# Patient Record
Sex: FEMALE | Race: BLACK OR AFRICAN AMERICAN | Employment: UNEMPLOYED | URBAN - METROPOLITAN AREA
[De-identification: names, ages, dates, MRNs, and addresses within clinical notes are randomized per-mention and may not be internally consistent; named-entity substitution may affect disease eponyms.]

---

## 2017-01-18 DIAGNOSIS — R41.3 MEMORY LOSS: ICD-10-CM

## 2017-01-18 DIAGNOSIS — R53.1 WEAKNESS GENERALIZED: ICD-10-CM

## 2017-01-18 DIAGNOSIS — G35 MULTIPLE SCLEROSIS (HCC): ICD-10-CM

## 2017-01-18 DIAGNOSIS — G82.50 QUADRIPARESIS (HCC): ICD-10-CM

## 2017-01-18 DIAGNOSIS — R27.0 ATAXIA: ICD-10-CM

## 2017-01-18 DIAGNOSIS — G35 OPTIC NEURITIS DUE TO MULTIPLE SCLEROSIS (HCC): ICD-10-CM

## 2017-01-18 DIAGNOSIS — H46.9 OPTIC NEURITIS DUE TO MULTIPLE SCLEROSIS (HCC): ICD-10-CM

## 2017-01-18 NOTE — TELEPHONE ENCOUNTER
No future appointments. Last Appointment My Department:  5/18/2016    Please advise of refill below. Requested Prescriptions     Pending Prescriptions Disp Refills    teriflunomide (AUBAGIO) 14 mg tab 30 Tab 0     Sig: Take 1 Tab by mouth daily (with dinner).  PATIENT MUST CALL FOR APPOINTMENT FOR FURTHER FILLS

## 2017-02-13 DIAGNOSIS — R53.1 WEAKNESS GENERALIZED: ICD-10-CM

## 2017-02-13 DIAGNOSIS — R27.0 ATAXIA: ICD-10-CM

## 2017-02-13 DIAGNOSIS — G82.50 QUADRIPARESIS (HCC): ICD-10-CM

## 2017-02-13 DIAGNOSIS — H46.9 OPTIC NEURITIS DUE TO MULTIPLE SCLEROSIS (HCC): ICD-10-CM

## 2017-02-13 DIAGNOSIS — R41.3 MEMORY LOSS: ICD-10-CM

## 2017-02-13 DIAGNOSIS — G35 MULTIPLE SCLEROSIS (HCC): ICD-10-CM

## 2017-02-13 DIAGNOSIS — G35 OPTIC NEURITIS DUE TO MULTIPLE SCLEROSIS (HCC): ICD-10-CM

## 2017-02-13 NOTE — TELEPHONE ENCOUNTER
No future appointments. Last Appointment My Department:  5/18/16    Please advise of refill below. Last filled 1/18/17 with note for patient to call for appointment for further refills      Called patient and left message for call back to schedule follow up appointment. Was supposed to have a 6 month follow up  Requested Prescriptions     Pending Prescriptions Disp Refills    teriflunomide (AUBAGIO) 14 mg tab 30 Tab 0     Sig: Take 1 Tab by mouth daily (with dinner).  PATIENT MUST CALL FOR APPOINTMENT FOR FURTHER FILLS

## 2017-03-16 DIAGNOSIS — R53.1 WEAKNESS GENERALIZED: ICD-10-CM

## 2017-03-16 DIAGNOSIS — H46.9 OPTIC NEURITIS DUE TO MULTIPLE SCLEROSIS (HCC): ICD-10-CM

## 2017-03-16 DIAGNOSIS — R41.3 MEMORY LOSS: ICD-10-CM

## 2017-03-16 DIAGNOSIS — G82.50 QUADRIPARESIS (HCC): ICD-10-CM

## 2017-03-16 DIAGNOSIS — G35 OPTIC NEURITIS DUE TO MULTIPLE SCLEROSIS (HCC): ICD-10-CM

## 2017-03-16 DIAGNOSIS — G35 MULTIPLE SCLEROSIS (HCC): ICD-10-CM

## 2017-03-16 DIAGNOSIS — R27.0 ATAXIA: ICD-10-CM

## 2017-03-16 RX ORDER — TERIFLUNOMIDE 14 MG/1
TABLET, FILM COATED ORAL
Qty: 28 TAB | Refills: 11 | Status: SHIPPED | OUTPATIENT
Start: 2017-03-16 | End: 2018-02-10 | Stop reason: SDUPTHER

## 2018-02-10 DIAGNOSIS — G35 MULTIPLE SCLEROSIS (HCC): ICD-10-CM

## 2018-02-10 DIAGNOSIS — H46.9 OPTIC NEURITIS DUE TO MULTIPLE SCLEROSIS (HCC): ICD-10-CM

## 2018-02-10 DIAGNOSIS — G82.50 QUADRIPARESIS (HCC): ICD-10-CM

## 2018-02-10 DIAGNOSIS — R53.1 WEAKNESS GENERALIZED: ICD-10-CM

## 2018-02-10 DIAGNOSIS — R41.3 MEMORY LOSS: ICD-10-CM

## 2018-02-10 DIAGNOSIS — R27.0 ATAXIA: ICD-10-CM

## 2018-02-10 DIAGNOSIS — G35 OPTIC NEURITIS DUE TO MULTIPLE SCLEROSIS (HCC): ICD-10-CM

## 2018-02-10 RX ORDER — TERIFLUNOMIDE 14 MG/1
TABLET, FILM COATED ORAL
Qty: 28 TAB | Refills: 11 | Status: SHIPPED | OUTPATIENT
Start: 2018-02-10 | End: 2018-03-16 | Stop reason: SDUPTHER

## 2018-02-28 DIAGNOSIS — R53.1 WEAKNESS GENERALIZED: ICD-10-CM

## 2018-02-28 DIAGNOSIS — G35 MULTIPLE SCLEROSIS (HCC): ICD-10-CM

## 2018-02-28 DIAGNOSIS — G82.50 QUADRIPARESIS (HCC): ICD-10-CM

## 2018-02-28 DIAGNOSIS — R27.0 ATAXIA: ICD-10-CM

## 2018-02-28 DIAGNOSIS — R41.3 MEMORY LOSS: ICD-10-CM

## 2018-02-28 DIAGNOSIS — G35 OPTIC NEURITIS DUE TO MULTIPLE SCLEROSIS (HCC): ICD-10-CM

## 2018-02-28 DIAGNOSIS — H46.9 OPTIC NEURITIS DUE TO MULTIPLE SCLEROSIS (HCC): ICD-10-CM

## 2018-02-28 RX ORDER — TERIFLUNOMIDE 14 MG/1
TABLET, FILM COATED ORAL
Qty: 28 TAB | Refills: 0 | OUTPATIENT
Start: 2018-02-28

## 2018-02-28 NOTE — TELEPHONE ENCOUNTER
No future appointments. Last Appointment My Department:  5/18/2016    Medication was refused because was sent om 2/10/2018. Duplicate. Called patient to set up appointment for follow up.  Left message for call back

## 2018-03-15 DIAGNOSIS — G82.50 QUADRIPARESIS (HCC): ICD-10-CM

## 2018-03-15 DIAGNOSIS — G35 OPTIC NEURITIS DUE TO MULTIPLE SCLEROSIS (HCC): ICD-10-CM

## 2018-03-15 DIAGNOSIS — R53.1 WEAKNESS GENERALIZED: ICD-10-CM

## 2018-03-15 DIAGNOSIS — R41.3 MEMORY LOSS: ICD-10-CM

## 2018-03-15 DIAGNOSIS — R27.0 ATAXIA: ICD-10-CM

## 2018-03-15 DIAGNOSIS — G35 MULTIPLE SCLEROSIS (HCC): ICD-10-CM

## 2018-03-15 DIAGNOSIS — H46.9 OPTIC NEURITIS DUE TO MULTIPLE SCLEROSIS (HCC): ICD-10-CM

## 2018-03-15 NOTE — TELEPHONE ENCOUNTER
Pt would like a rx refill for Aubagio. She was told she had to see Dr. Leidy Bashir first. I scheduled her for the next fu in October. Wants to know if rx can be filled before than.

## 2018-10-10 ENCOUNTER — OFFICE VISIT (OUTPATIENT)
Dept: NEUROLOGY | Age: 48
End: 2018-10-10

## 2018-10-10 VITALS
WEIGHT: 182 LBS | OXYGEN SATURATION: 97 % | SYSTOLIC BLOOD PRESSURE: 144 MMHG | DIASTOLIC BLOOD PRESSURE: 78 MMHG | BODY MASS INDEX: 28.56 KG/M2 | HEART RATE: 70 BPM | RESPIRATION RATE: 12 BRPM | HEIGHT: 67 IN

## 2018-10-10 DIAGNOSIS — R53.1 WEAKNESS GENERALIZED: ICD-10-CM

## 2018-10-10 DIAGNOSIS — G82.50 QUADRIPARESIS (HCC): ICD-10-CM

## 2018-10-10 DIAGNOSIS — R27.0 ATAXIA: ICD-10-CM

## 2018-10-10 DIAGNOSIS — R41.3 MEMORY LOSS: ICD-10-CM

## 2018-10-10 DIAGNOSIS — G35 OPTIC NEURITIS DUE TO MULTIPLE SCLEROSIS (HCC): ICD-10-CM

## 2018-10-10 DIAGNOSIS — H46.9 OPTIC NEURITIS DUE TO MULTIPLE SCLEROSIS (HCC): ICD-10-CM

## 2018-10-10 DIAGNOSIS — G35 MULTIPLE SCLEROSIS (HCC): Primary | ICD-10-CM

## 2018-10-10 RX ORDER — LISINOPRIL AND HYDROCHLOROTHIAZIDE 10; 12.5 MG/1; MG/1
TABLET ORAL
Refills: 0 | COMMUNITY
Start: 2018-07-26 | End: 2021-06-01 | Stop reason: ALTCHOICE

## 2018-10-10 RX ORDER — CLEMASTINE FUMARATE 2.68 MG/1
2.68 TABLET ORAL
Qty: 180 TAB | Refills: 5 | Status: SHIPPED | OUTPATIENT
Start: 2018-10-10 | End: 2020-03-19 | Stop reason: ALTCHOICE

## 2018-10-10 NOTE — MR AVS SNAPSHOT
[address], 
[address], [address] [facility]. 
[phone] Patient: Jian Alejandre MRN: [id number] PED:[birth year] Visit Information Date & Time Provider Department Dept. Phone Encounter #  
 10/10/2018  8:00 AM Radha Romero MD [facility] [id number] Follow-up Instructions Return in about 1 year (around 10/10/2019). Upcoming Health Maintenance Date Due DTaP/Tdap/Td series (1 - Tdap) 5/10/1989 PAP AKA CERVICAL CYTOLOGY 5/10/1989 Shingrix Vaccine Age 50> (1 of 2) 5/10/2018 BREAST CANCER SCRN MAMMOGRAM 5/10/2018 FOBT Q 1 YEAR AGE 50-75 5/10/2018 Influenza Age 5 to Adult 8/1/2018 Allergies as of 10/10/2018  Review Complete On: 10/10/2018 By: Radha Romero MD  
  
 Severity Noted Reaction Type Reactions Doxycycline High 01/18/2016    Hives Bystolic [Nebivolol]  47/91/1409    Other (comments) Chest pain Losartan  05/18/2016    Itching Lotrel [Amlodipine-benazepril]  08/28/2015    Swelling Current Immunizations  Never Reviewed No immunizations on file. Not reviewed this visit You Were Diagnosed With   
  
 Codes Comments Multiple sclerosis ([facility]    -  Primary ICD-10-CM: G35 
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 
ICD-9-CM: 377.30, 340 Weakness generalized     ICD-10-CM: R53.1 ICD-9-CM: 780.79 Memory loss     ICD-10-CM: R41.3 ICD-9-CM: 780.93 Vitals BP Pulse Resp Height(growth percentile) Weight(growth percentile) SpO2  
 144/78 70 12 5' 7\" (1.702 m) 182 lb (82.6 kg) 97% BMI OB Status Smoking Status 28.51 kg/m2 Menopause Never Smoker Vitals History BMI and BSA Data Body Mass Index Body Surface Area 28.51 kg/m 2 1.98 m 2 Preferred Pharmacy Pharmacy Name Phone [address] [facility], [address] [place] [address] Your Updated Medication List  
  
   
This list is accurate as of 10/10/18  9:05 AM.  Always use your most recent med list.  
  
  
  
  
 CENTRUM 0.4-162-18 mg Tab Generic drug:  YS-QQ-CH-Fe-Min-Lycopen-Lutein Take  by mouth. clemastine 2.68 mg Tab tablet Take 1 Tab by mouth two (2) times daily (after meals). lisinopril-hydroCHLOROthiazide 10-12.5 mg per tablet Commonly known as:  PRINZIDE, ZESTORETIC  
TAKE 1 TABLET BY MOUTH EVERY DAY  
  
 teriflunomide 14 mg Tab Commonly known as:  AUBAGIO  
TAKE 1 TABLET DAILY WITH DINNER  
  
 VITAMIN D3 1,000 unit Cap Generic drug:  cholecalciferol Take 2,000 Units by mouth. Prescriptions Printed Refills  
 clemastine 2.68 mg tab tablet 5 Sig: Take 1 Tab by mouth two (2) times daily (after meals). Class: Print Route: Oral  
  
Prescriptions Sent to Pharmacy Refills  
 teriflunomide (AUBAGIO) 14 mg tab 3 Sig: TAKE 1 TABLET DAILY WITH DINNER Class: Normal  
 Pharmacy: [facility], [address] [facility] #: [phone] We Performed the Following CBC WITH AUTOMATED DIFF [40524 CPT(R)] METABOLIC PANEL, COMPREHENSIVE [64410 CPT(R)] Follow-up Instructions Return in about 1 year (around 10/10/2019). To-Do List   
 10/17/2018 Imaging:  MRI BRAIN WO CONT   
  
 10/17/2018 Imaging:  MRI CERV SPINE WO CONT Patient Instructions Office Policies 
 
o Phone calls/patient messages: Please allow up to 24 hours for someone in the office to contact you about your call or message. Be mindful your provider may be out of the office or your message may require further review. We encourage you to use [employer] for your messages as this is a faster, more efficient way to communicate with our office o Medication Refills: 
Prescription medications require up to 48 business hours to process. We encourage you to use Son[employer] for your refills. For controlled medications: Please allow up to 72 business hours to process. Certain medications may require you to  a written prescription at our office. NO narcotic/controlled medications will be prescribed after 4pm Monday through Friday or on weekends 
 
o Form/Paperwork Completion: 
Please note there is a $25 fee for all paperwork completed by our providers. We ask that you allow 7-14 business days. Pre-payment is due prior to picking up/faxing the completed form. You may also download your forms to [employer] to have your doctor print off. A Healthy Lifestyle: Care Instructions Your Care Instructions A healthy lifestyle can help you feel good, stay at a healthy weight, and have plenty of energy for both work and play. A healthy lifestyle is something you can share with your whole family. A healthy lifestyle also can lower your risk for serious health problems, such as high blood pressure, heart disease, and diabetes. You can follow a few steps listed below to improve your health and the health of your family. Follow-up care is a key part of your treatment and safety. Be sure to make and go to all appointments, and call your doctor if you are having problems. It's also a good idea to know your test results and keep a list of the medicines you take. How can you care for yourself at home? · Do not eat too much sugar, fat, or fast foods. You can still have dessert and treats now and then. The goal is moderation. · Start small to improve your eating habits. Pay attention to portion sizes, drink less juice and soda pop, and eat more fruits and vegetables. ¨ Eat a healthy amount of food. A 3-ounce serving of meat, for example, is about the size of a deck of cards. Fill the rest of your plate with vegetables and whole grains. ¨ Limit the amount of soda and sports drinks you have every day. Drink more water when you are thirsty. ¨ Eat at least 5 servings of fruits and vegetables every day. It may seem like a lot, but it is not hard to reach this goal. A serving or helping is 1 piece of fruit, 1 cup of vegetables, or 2 cups of leafy, raw vegetables. Have an apple or some carrot sticks as an afternoon snack instead of a candy bar. Try to have fruits and/or vegetables at every meal. 
· Make exercise part of your daily routine. You may want to start with simple activities, such as walking, bicycling, or slow swimming. Try to be active 30 to 60 minutes every day. You do not need to do all 30 to 60 minutes all at once. For example, you can exercise 3 times a day for 10 or 20 minutes. Moderate exercise is safe for most people, but it is always a good idea to talk to your doctor before starting an exercise program. 
· Keep moving. [facility] the lawn, work in the garden, or [employer]. Take the stairs instead of the elevator at work. · If you smoke, quit. People who smoke have an increased risk for heart attack, stroke, cancer, and other lung illnesses. Quitting is hard, but there are ways to boost your chance of quitting tobacco for good. ¨ Use nicotine gum, patches, or lozenges. ¨ Ask your doctor about stop-smoking programs and medicines. ¨ Keep trying. In addition to reducing your risk of diseases in the future, you will notice some benefits soon after you stop using tobacco. If you have shortness of breath or asthma symptoms, they will likely get better within a few weeks after you quit. · Limit how much alcohol you drink. Moderate amounts of alcohol (up to 2 drinks a day for men, 1 drink a day for women) are okay. But drinking too much can lead to liver problems, high blood pressure, and other health problems. Family health If you have a family, there are many things you can do together to improve your health. · Eat meals together as a family as often as possible. · Eat healthy foods. This includes fruits, vegetables, lean meats and dairy, and whole grains. · Include your family in your fitness plan. Most people think of activities such as jogging or tennis as the way to fitness, but there are many ways you and your family can be more active. Anything that makes you breathe hard and gets your heart pumping is exercise. Here are some tips: 
¨ Walk to do errands or to take your child to school or the bus. ¨ Go for a family bike ride after dinner instead of watching TV. Where can you learn more? Go to http://rayneZENN Motormarialuisa.info/. Enter E745 in the search box to learn more about \"A Healthy Lifestyle: Care Instructions. \" Current as of: December 7, 2017 Content Version: 11.8 © 2785-7652 [employer]. Care instructions adapted under license by NextPage (which disclaims liability or warranty for this information). If you have questions about a medical condition or this instruction, always ask your healthcare professional. [address] any warranty or liability for your use of this information. Introducing \[facility] [place] [facility]\"" & HEALTH SERVICES! [facility] introduces Ledzworld patient portal. Now you can access parts of your medical record, email your doctor's office, and request medication refills online. 1. In your internet browser, go to [email or web address] 2. Click on the First Time User? Click Here link in the Sign In box. You will see the New Member Sign Up page. 3. Enter your Ledzworld Access Code exactly as it appears below. You will not need to use this code after youve completed the sign-up process. If you do not sign up before the expiration date, you must request a new code. · Ledzworld Access Code: [email or web address] Expires: 1/8/2019  8:38 AM 
 
4.  Enter the last four digits of your Social Security Number (xxxx) and Date of Birth (mm/dd/yyyy) as indicated and click Submit. You will be taken to the next sign-up page. 5. Create a Dealised ID. This will be your Dealised login ID and cannot be changed, so think of one that is secure and easy to remember. 6. Create a Dealised password. You can change your password at any time. 7. Enter your Password Reset Question and Answer. This can be used at a later time if you forget your password. 8. Enter your e-mail address. You will receive e-mail notification when new information is available in [address] 9. Click Sign Up. You can now view and download portions of your medical record. 10. Click the Download Summary menu link to download a portable copy of your medical information. If you have questions, please visit the Frequently Asked Questions section of the Dealised website. Remember, Dealised is NOT to be used for urgent needs. For medical emergencies, dial 911. Now available from your iPhone and Android! Please provide this summary of care documentation to your next provider. Your primary care clinician is listed as [address]. If you have any questions after today's visit, please call [phone].

## 2018-10-10 NOTE — PATIENT INSTRUCTIONS
Office Policies 
 
o Phone calls/patient messages: Please allow up to 24 hours for someone in the office to contact you about your call or message. Be mindful your provider may be out of the office or your message may require further review. We encourage you to use [email or web address] for your messages as this is a faster, more efficient way to communicate with our office 
 
o Medication Refills: 
Prescription medications require up to 48 business hours to process. We encourage you to use [email or web address] for your refills. For controlled medications: Please allow up to 72 business hours to process. Certain medications may require you to  a written prescription at our office. NO narcotic/controlled medications will be prescribed after 4pm Monday through Friday or on weekends 
 
o Form/Paperwork Completion: 
Please note there is a $25 fee for all paperwork completed by our providers. We ask that you allow 7-14 business days. Pre-payment is due prior to picking up/faxing the completed form. You may also download your forms to [email or web address] to have your doctor print off. A Healthy Lifestyle: Care Instructions Your Care Instructions A healthy lifestyle can help you feel good, stay at a healthy weight, and have plenty of energy for both work and play. A healthy lifestyle is something you can share with your whole family. A healthy lifestyle also can lower your risk for serious health problems, such as high blood pressure, heart disease, and diabetes. You can follow a few steps listed below to improve your health and the health of your family. Follow-up care is a key part of your treatment and safety. Be sure to make and go to all appointments, and call your doctor if you are having problems. It's also a good idea to know your test results and keep a list of the medicines you take. How can you care for yourself at home? · Do not eat too much sugar, fat, or fast foods.  You can still have dessert and treats now and then. The goal is moderation. · Start small to improve your eating habits. Pay attention to portion sizes, drink less juice and soda pop, and eat more fruits and vegetables. ¨ Eat a healthy amount of food. A 3-ounce serving of meat, for example, is about the size of a deck of cards. Fill the rest of your plate with vegetables and whole grains. ¨ Limit the amount of soda and sports drinks you have every day. Drink more water when you are thirsty. ¨ Eat at least 5 servings of fruits and vegetables every day. It may seem like a lot, but it is not hard to reach this goal. A serving or helping is 1 piece of fruit, 1 cup of vegetables, or 2 cups of leafy, raw vegetables. Have an apple or some carrot sticks as an afternoon snack instead of a candy bar. Try to have fruits and/or vegetables at every meal. 
· Make exercise part of your daily routine. You may want to start with simple activities, such as walking, bicycling, or slow swimming. Try to be active 30 to 60 minutes every day. You do not need to do all 30 to 60 minutes all at once. For example, you can exercise 3 times a day for 10 or 20 minutes. Moderate exercise is safe for most people, but it is always a good idea to talk to your doctor before starting an exercise program. 
· Keep moving. Reading [employer] the lawn, work in the garden, or [employer]. Take the stairs instead of the elevator at work. · If you smoke, quit. People who smoke have an increased risk for heart attack, stroke, cancer, and other lung illnesses. Quitting is hard, but there are ways to boost your chance of quitting tobacco for good. ¨ Use nicotine gum, patches, or lozenges. ¨ Ask your doctor about stop-smoking programs and medicines. ¨ Keep trying.  
In addition to reducing your risk of diseases in the future, you will notice some benefits soon after you stop using tobacco. If you have shortness of breath or asthma symptoms, they will likely get better within a few weeks after you quit. · Limit how much alcohol you drink. Moderate amounts of alcohol (up to 2 drinks a day for men, 1 drink a day for women) are okay. But drinking too much can lead to liver problems, high blood pressure, and other health problems. Family health If you have a family, there are many things you can do together to improve your health. · Eat meals together as a family as often as possible. · Eat healthy foods. This includes fruits, vegetables, lean meats and dairy, and whole grains. · Include your family in your fitness plan. Most people think of activities such as jogging or tennis as the way to fitness, but there are many ways you and your family can be more active. Anything that makes you breathe hard and gets your heart pumping is exercise. Here are some tips: 
¨ Walk to do errands or to take your child to school or the bus. ¨ Go for a family bike ride after dinner instead of watching TV. Where can you learn more? Go to http://rayne-marialuisa.info/. Enter H881 in the search box to learn more about \"A Healthy Lifestyle: Care Instructions. \" Current as of: December 7, 2017 Content Version: 11.8 © 1077-6173 Healthwise, [employer]. Care instructions adapted under license by [employer] (which disclaims liability or warranty for this information). If you have questions about a medical condition or this instruction, always ask your healthcare professional. [place] [address] any warranty or liability for your use of this information.

## 2018-10-11 LAB
ALBUMIN SERPL-MCNC: 4.3 G/DL (ref 3.5–5.5)
ALBUMIN/GLOB SERPL: 1.4 {RATIO} (ref 1.2–2.2)
ALP SERPL-CCNC: 79 IU/L (ref 39–117)
ALT SERPL-CCNC: 13 IU/L (ref 0–32)
AST SERPL-CCNC: 16 IU/L (ref 0–40)
BASOPHILS # BLD AUTO: 0 X10E3/UL (ref 0–0.2)
BASOPHILS NFR BLD AUTO: 0 %
BILIRUB SERPL-MCNC: 0.2 MG/DL (ref 0–1.2)
BUN SERPL-MCNC: 14 MG/DL (ref 6–24)
BUN/CREAT SERPL: 19 (ref 9–23)
CALCIUM SERPL-MCNC: 10 MG/DL (ref 8.7–10.2)
CHLORIDE SERPL-SCNC: 102 MMOL/L (ref 96–106)
CO2 SERPL-SCNC: 23 MMOL/L (ref 20–29)
CREAT SERPL-MCNC: 0.73 MG/DL (ref 0.57–1)
EOSINOPHIL # BLD AUTO: 0.6 X10E3/UL (ref 0–0.4)
EOSINOPHIL NFR BLD AUTO: 6 %
ERYTHROCYTE [DISTWIDTH] IN BLOOD BY AUTOMATED COUNT: 13.2 % (ref 12.3–15.4)
GLOBULIN SER CALC-MCNC: 3.1 G/DL (ref 1.5–4.5)
GLUCOSE SERPL-MCNC: 99 MG/DL (ref 65–99)
HCT VFR BLD AUTO: 39 % (ref 34–46.6)
HGB BLD-MCNC: 13.1 G/DL (ref 11.1–15.9)
IMM GRANULOCYTES # BLD: 0 X10E3/UL (ref 0–0.1)
IMM GRANULOCYTES NFR BLD: 0 %
LYMPHOCYTES # BLD AUTO: 3.8 X10E3/UL (ref 0.7–3.1)
LYMPHOCYTES NFR BLD AUTO: 36 %
MCH RBC QN AUTO: 29.5 PG (ref 26.6–33)
MCHC RBC AUTO-ENTMCNC: 33.6 G/DL (ref 31.5–35.7)
MCV RBC AUTO: 88 FL (ref 79–97)
MONOCYTES # BLD AUTO: 0.6 X10E3/UL (ref 0.1–0.9)
MONOCYTES NFR BLD AUTO: 5 %
NEUTROPHILS # BLD AUTO: 5.6 X10E3/UL (ref 1.4–7)
NEUTROPHILS NFR BLD AUTO: 53 %
PLATELET # BLD AUTO: 315 X10E3/UL (ref 150–379)
POTASSIUM SERPL-SCNC: 3.8 MMOL/L (ref 3.5–5.2)
PROT SERPL-MCNC: 7.4 G/DL (ref 6–8.5)
RBC # BLD AUTO: 4.44 X10E6/UL (ref 3.77–5.28)
SODIUM SERPL-SCNC: 140 MMOL/L (ref 134–144)
WBC # BLD AUTO: 10.7 X10E3/UL (ref 3.4–10.8)

## 2018-10-11 NOTE — PROGRESS NOTES
Consult Subjective:  
 
Jian Alejandre is a 46 y. o.right-handed Afro-American female seen for evaluation at the request of Dr. Jose Chan of new problem of wanting to know if taking lisinopril could help her MS as she read somewhere or patient told her somewhere that it could help her get better because of her recent rat study showing it helped chemically induced MS. I advised her that a rest study gave no evidence for efficacy in humans, and she is on the medication and feels a little better, I told her that is fine because she needs something for her blood pressure anyway and if there is a chance it might conceivably help there is no reason she should not keep taking the medication because it is controlling her blood pressure. I told her about Novahist, and she wants to try that so new prescription sent in for that which does have some evidence that it might conceivably be disease modifying and show some improvement by re-myelinated the nerves. The patient has MS, and last time I saw her was over 2 years ago. She has had no recent follow-up. She had an MRI scan done of the cervical spine and brain with and without contrast, that showed no new active lesions except for a questionable slight enhancing lesion that T1C7 and she has not had any exacerbations, change in her neurologic condition, actually feels somewhat better no longer uses her cane for ambulation and works a few days a week part-time at the [employer]. We renewed her Aubagio which she is taking, and tolerating well without side effects and has done very well for the last several years.   She thinks her rash started after gadolinium enhancement was given, so we will repeat her new MRI scans is been more than 3 years without dye because she is afraid to make her rash worse, but I told her she still has some minor rash which she does I seriously doubt has any relation to the gadolinium dye which was given over 3 years ago she should get back with her dermatologist to try to get a more definitive diagnosis. She's had chronic MS with relapsing form for years, and has had MRIs in the past showing brain lesions and cervical spine lesions and abnormal spinal fluid all diagnosed with MS. She's had MS over 23 years. She has bilateral weakness, cognitive issues in coordination issues and gait tissues and fatigue. The patient is clearly disabled. She has not had any acute exacerbations in the last months. She has had no new focal weakness sensory loss vision problems or cognitive issues that are new. She has to ambulate with a cane because of her gait instability. She is very limited in her exertional activity due to the fatigue and weakness. She's had no other side effects on the medication. Patient also has decreased coordination in her hands, difficulty focusing her eyes and is mildly cognitively slow She's had no other new medical problem complication or illnesses Past Medical History:  
Diagnosis Date  Alopecia  Essential hypertension  Multiple sclerosis ([facility] [address]  Neurological disorder History reviewed. No pertinent surgical history. Family History Problem Relation Age of Onset  Cancer Mother  Heart Disease Mother Social History Substance Use Topics  Smoking status: Never Smoker  Smokeless tobacco: Never Used  Alcohol use No  
   
Current Outpatient Prescriptions Medication Sig Dispense Refill  lisinopril-hydroCHLOROthiazide (PRINZIDE, ZESTORETIC) 10-12.5 mg per tablet TAKE 1 TABLET BY MOUTH EVERY DAY  0  
 clemastine 2.68 mg tab tablet Take 1 Tab by mouth two (2) times daily (after meals). 180 Tab 5  teriflunomide (AUBAGIO) 14 mg tab TAKE 1 TABLET DAILY WITH DINNER 90 Tab 3  Cholecalciferol, Vitamin D3, (VITAMIN D3) 1,000 unit cap Take 2,000 Units by mouth.  DU-CS-CN-Fe-Min-Lycopen-Lutein (CENTRUM) 0.4-162-18 mg tab Take  by mouth. Allergies Allergen Reactions  Doxycycline Hives  Bystolic [Nebivolol] Other (comments) Chest pain  Losartan Itching  Lotrel [Amlodipine-Benazepril] Swelling Review of Systems: A comprehensive review of systems was negative except for: Musculoskeletal: positive for myalgias, arthralgias and stiff joints Neurological: positive for memory problems, paresthesia, coordination problems, gait problems and weakness Objective: I 
 
 
NEUROLOGICAL EXAM: 
 
Appearance: The patient is well developed, well nourished, provides a coherent history and is in no acute distress. Patient has multiple skin lesions, including nodules and papules and darkened rashes from previous lesions on her legs and arms and hands Mental Status: Oriented to time, place and person. Mood and affect appropriate. Cranial Nerves:   Intact visual fields. Fundi are benign, with mild bilateral optic pallor seen. HILDA, EOM's full, no nystagmus, no ptosis. Facial sensation is normal. Corneal reflexes are not tested. Facial movement is symmetric. Hearing is normal bilaterally. Palate is midline with normal sternocleidomastoid and trapezius muscles are normal. Tongue is midline. Neck is supple without meningismus or bruits Temporal arteries are not tender or enlarged Motor:  4/5 strength in upper and lower proximal and distal muscles, left Side seems weaker than the right . Normal bulk and increased tone. No fasciculations. Patient has moderate spastic weakness in all extremities with decreased coordination. Reflexes:   Deep tendon reflexes 3+/4 and symmetrical. No clear Babinski or clonus present Sensory:   Normal to touch, pinprick and vibration mildly decreased in both lower extremities. Gait:  Abnormal gait due to bilateral spastic weakness L>R, patient can walk without assistive devices, but is slow due to her spastic paraparetic gait. Tremor:   No tremor noted. Cerebellar:  Abnormal Romberg and tandem cerebellar signs present. Neurovascular:  Normal heart sounds and regular rhythm, peripheral pulses decreased, and no carotid bruits. Assessment:  
 
Plan:  
 
New problem of questioning whether or not she should take lisinopril, and I told her as long as she controls her blood pressure that medication and want to try because of the rat study that is fine with me Patient given prescription for Novahist in view of recent article showing it might be disease modifying drug and risk and benefits all explained to the patient in general she was started on 2.68 mg twice a day. Relapsing remitting multiple sclerosis with moderate to severe disability, but no acute exacerbation recently but needs repeat MRI scans is been 3 years since her last scans, will do without contrast because of her rash which he feels will start about a gadolinium though I keep telling her I doubted Persistent rash, etiology unknown, recommended patient get back with dermatology to try to figure out what causes a rash We will get metabolic parameters to check the safety of her Aubagio but she is tolerating it well without side effects Patient will continue [place] We will continue to follow, and blood work was drawn today She is to continue to work with her dermatologist and use Vistaril to use p.r.n. for itching She is to continue her current medications and vitamins and vitamin D and remain mentally and physically active as possible. Followup visit in 6 months time. Metabolic parameters will check today Signed By: Tariq aDsh MD   
 October 10, 2018 This note will not be viewable in [address]

## 2019-03-15 DIAGNOSIS — G35 MULTIPLE SCLEROSIS (HCC): Primary | ICD-10-CM

## 2019-03-15 RX ORDER — METHYLPREDNISOLONE 4 MG/1
TABLET ORAL
Qty: 1 DOSE PACK | Refills: 0 | Status: SHIPPED | OUTPATIENT
Start: 2019-03-15 | End: 2020-03-19 | Stop reason: ALTCHOICE

## 2019-04-19 ENCOUNTER — TELEPHONE (OUTPATIENT)
Dept: NEUROLOGY | Age: 49
End: 2019-04-19

## 2019-04-19 NOTE — TELEPHONE ENCOUNTER
----- Message from Candy Casas sent at 4/19/2019 12:20 PM EDT -----  Regarding: /Telephone  Pt is returning a call. Best contact number is    [phone].

## 2019-04-22 ENCOUNTER — TELEPHONE (OUTPATIENT)
Dept: NEUROLOGY | Age: 49
End: 2019-04-22

## 2019-04-22 NOTE — TELEPHONE ENCOUNTER
Jef shipped on 4/15/19     Approval from \"[employer]\"  1/11/19 - 1/9/2020. When running RTE for pt's bcbs - showing ineligible as of 4/17/19. Called [phone]    Was given an ID of [id number]   [id number] Scotty Lawrence,     I am unfamiliar with \"[employer]\". We need a copy of her new insurance in order to proceed with P.A. For Aubagio.

## 2019-04-22 NOTE — TELEPHONE ENCOUNTER
----- Message from Brandin Victroia sent at 4/22/2019  2:25 PM EDT -----  Regarding: Dr. Romaine Pat  Contact: [phone]  Pt returning missed call.

## 2019-04-22 NOTE — TELEPHONE ENCOUNTER
I called and left message for patient to call back with the rx insurance information or come by to drop off the card for this

## 2019-04-24 NOTE — TELEPHONE ENCOUNTER
Called and notified this was for cancellation.  Will keep on the list and call back if I receive a new one

## 2019-05-28 ENCOUNTER — TELEPHONE (OUTPATIENT)
Dept: NEUROLOGY | Age: 49
End: 2019-05-28

## 2019-05-28 NOTE — TELEPHONE ENCOUNTER
Olegario mckeon/ [employer] emailed me and asked if we had obtained a new PA for this patient's Rebif. (stating she has new insurance). When I submitted it to [employer] On 4/26/19 - stated no PA is required. If the patient has anything else, I have asked him to fax me a copy of the PA request w/ the new insurance information today.

## 2019-05-29 ENCOUNTER — TELEPHONE (OUTPATIENT)
Dept: NEUROLOGY | Age: 49
End: 2019-05-29

## 2019-05-29 NOTE — TELEPHONE ENCOUNTER
Called [employer] re: Aubagio authorization as I have received repeated requests from [facility] at [employer] for a P.A. [employer] stated this was initiated directly by the insurance co - (Arlette [id number]). They show an effective date of 1-11-19 - 1-9-2020. Last shipment to patient on 5/13/19. Requested copy of auth - but since it was done by [facility] they do not have a case number or auth number to give me. Forwarding this information to [facility]. Verified SPP is [facility].

## 2019-07-22 ENCOUNTER — TELEPHONE (OUTPATIENT)
Dept: NEUROLOGY | Age: 49
End: 2019-07-22

## 2019-07-22 DIAGNOSIS — G35 OPTIC NEURITIS DUE TO MULTIPLE SCLEROSIS (HCC): ICD-10-CM

## 2019-07-22 DIAGNOSIS — G35 MULTIPLE SCLEROSIS (HCC): ICD-10-CM

## 2019-07-22 DIAGNOSIS — R27.0 ATAXIA: ICD-10-CM

## 2019-07-22 DIAGNOSIS — G82.50 QUADRIPARESIS (HCC): ICD-10-CM

## 2019-07-22 DIAGNOSIS — H46.9 OPTIC NEURITIS DUE TO MULTIPLE SCLEROSIS (HCC): ICD-10-CM

## 2019-07-22 DIAGNOSIS — R53.1 WEAKNESS GENERALIZED: ICD-10-CM

## 2019-07-22 DIAGNOSIS — R41.3 MEMORY LOSS: ICD-10-CM

## 2019-07-22 NOTE — TELEPHONE ENCOUNTER
I called [employer] and found that the patient needs to enrolls in copay assistance as the cost is very high. I called the patient back and found that she thought she was good until next year. I did resend in the medication for qty 80 per Dr. Myers Memory as the qty has changed on the bottles of Aubagio.     I asked her to call [facility] to look in to this and then call me back with an update in the next day or 2

## 2019-07-22 NOTE — TELEPHONE ENCOUNTER
Patient called to request a refill on her Aubagio. She says that she has elizabeth without medication for 3 weeks.        [phone]

## 2019-08-06 ENCOUNTER — TELEPHONE (OUTPATIENT)
Dept: NEUROLOGY | Age: 49
End: 2019-08-06

## 2019-08-06 DIAGNOSIS — R27.0 ATAXIA: ICD-10-CM

## 2019-08-06 DIAGNOSIS — R41.3 MEMORY LOSS: ICD-10-CM

## 2019-08-06 DIAGNOSIS — G35 MULTIPLE SCLEROSIS (HCC): ICD-10-CM

## 2019-08-06 DIAGNOSIS — R53.1 WEAKNESS GENERALIZED: ICD-10-CM

## 2019-08-06 DIAGNOSIS — G82.50 QUADRIPARESIS (HCC): ICD-10-CM

## 2019-08-06 DIAGNOSIS — H46.9 OPTIC NEURITIS DUE TO MULTIPLE SCLEROSIS (HCC): ICD-10-CM

## 2019-08-06 DIAGNOSIS — G35 OPTIC NEURITIS DUE TO MULTIPLE SCLEROSIS (HCC): ICD-10-CM

## 2019-08-16 ENCOUNTER — TELEPHONE (OUTPATIENT)
Dept: NEUROLOGY | Age: 49
End: 2019-08-16

## 2019-08-28 ENCOUNTER — TELEPHONE (OUTPATIENT)
Dept: NEUROLOGY | Age: 49
End: 2019-08-28

## 2019-08-29 ENCOUNTER — TELEPHONE (OUTPATIENT)
Dept: NEUROLOGY | Age: 49
End: 2019-08-29

## 2019-08-29 NOTE — TELEPHONE ENCOUNTER
Re: Rec'd call from [employer]  Ph [phone]    Ref [phone] stated Aubagio not covered, recommended Glat[employer]amer. Refer to [facility] for assistance.

## 2019-08-29 NOTE — TELEPHONE ENCOUNTER
Re: Jennie Mondragon s/w pharmacist - submitted clinicals over the phone for cont of treatment     [employer] I.D. [id number]     Approval Case [id number]  8/28/19  - 8/28/2020. The previous note and auth that is shown in chart for Jan 2020 is for the patient assistance only. Pharmacist will update and will send to [facility] to process shipment. Mario Ritter - please notify patient to except call from [facility].

## 2020-02-05 ENCOUNTER — TELEPHONE (OUTPATIENT)
Dept: NEUROLOGY | Age: 50
End: 2020-02-05

## 2020-02-05 NOTE — TELEPHONE ENCOUNTER
----- Message from Sudeep Nurse sent at 2/5/2020  1:20 PM EST -----  Regarding: Dr Noguera/telephone  Pt is calling regarding her medication that her insurance is not paying for anymore, please call pt at [phone]. This is pt 2nd call.

## 2020-02-07 NOTE — TELEPHONE ENCOUNTER
I tried to call, but call could not be completed per answering message.  Sent a message to the prior authorization specialist

## 2020-02-11 NOTE — TELEPHONE ENCOUNTER
Re: Mirian Escalona  - submitted to [employer]:       Response: An active PA is already on file with expiration date of 08/28/2020. Please wait to resubmit request within 60 days of that expiration date to obtain a PA renewal.    Emailed this response to Jayla at [facility] to schedule shipment.      Dee Dee Joseph, [address] ([employer])  Covered: Retail, Mail Order Unknown: Specialty, Long-Term Care               Member ID: [id number] - F     Group ID: [id number] [facility]      Group Name: [id number] [place] [address]

## 2020-02-12 ENCOUNTER — TELEPHONE (OUTPATIENT)
Dept: NEUROLOGY | Age: 50
End: 2020-02-12

## 2020-02-12 NOTE — TELEPHONE ENCOUNTER
Re: Aubagio Rx     F/u from Leonila/Merle -     I have a prescription with one refill remaining and we last shipped a month's supply of meds to her on 01.21.20. I'll definitely need a new Rx in March.      Thanks!  -Andria lugo to [place]

## 2020-02-13 DIAGNOSIS — R27.0 ATAXIA: ICD-10-CM

## 2020-02-13 DIAGNOSIS — R53.1 WEAKNESS GENERALIZED: ICD-10-CM

## 2020-02-13 DIAGNOSIS — G35 MULTIPLE SCLEROSIS (HCC): ICD-10-CM

## 2020-02-13 DIAGNOSIS — G35 OPTIC NEURITIS DUE TO MULTIPLE SCLEROSIS (HCC): ICD-10-CM

## 2020-02-13 DIAGNOSIS — G82.50 QUADRIPARESIS (HCC): ICD-10-CM

## 2020-02-13 DIAGNOSIS — R41.3 MEMORY LOSS: ICD-10-CM

## 2020-02-13 DIAGNOSIS — H46.9 OPTIC NEURITIS DUE TO MULTIPLE SCLEROSIS (HCC): ICD-10-CM

## 2020-02-20 ENCOUNTER — TELEPHONE (OUTPATIENT)
Dept: NEUROLOGY | Age: 50
End: 2020-02-20

## 2020-02-20 NOTE — TELEPHONE ENCOUNTER
Pt stated her insurance will not cover the Aubagio anymore and wanted to know if Dr. Vernon Martinez will prescribe something else.  She said that she has two pills left      [phone]

## 2020-02-21 NOTE — TELEPHONE ENCOUNTER
I called and she has been receiving her medication. She is just concerned because she has received 2 letters notifying her that her insurance will no longer cover Aubagio. I asked her to call [facility] to make sure the claim was paid for. She acknowledged understanding.  I did make a follow up appointment for her next month since she has not been seen since 2018

## 2020-02-21 NOTE — TELEPHONE ENCOUNTER
Jef [address] should be good to August 2020. Sent message to Leonila/Amyo to check on Rx that was sent in per Dr. Tessa Stuart on 2/13/20.

## 2020-02-28 ENCOUNTER — TELEPHONE (OUTPATIENT)
Dept: NEUROLOGY | Age: 50
End: 2020-02-28

## 2020-02-28 NOTE — TELEPHONE ENCOUNTER
I will hold off on renewing Aubagio until patient has updated office visit. - Current Lacie Pouch is good to August 2020. Sent request to [facility]:     Can you put a future date to check a test claim to make sure Lacie Pouch is still valid. Pt states she has recd 3 letters that it will not be covered. It was delivered per you on 2/21/20 for a 30 day supply    Re: Darius Lopez [birth year]    Melissa Ahmadi,     No future appt scheduled.

## 2020-02-28 NOTE — TELEPHONE ENCOUNTER
Reply from Jeanie Burrows at [employer] - re: [address]' know why she's getting letters -- I don't even see a communication where we've sent her anything. I ran a test claim with today's date and it paid. I also ran a test claim with a date of 03.20.20 and it also paid.

## 2020-03-19 ENCOUNTER — TELEPHONE (OUTPATIENT)
Dept: NEUROLOGY | Age: 50
End: 2020-03-19

## 2020-03-19 ENCOUNTER — OFFICE VISIT (OUTPATIENT)
Dept: NEUROLOGY | Age: 50
End: 2020-03-19

## 2020-03-19 VITALS
HEART RATE: 87 BPM | SYSTOLIC BLOOD PRESSURE: 130 MMHG | BODY MASS INDEX: 31.08 KG/M2 | DIASTOLIC BLOOD PRESSURE: 70 MMHG | OXYGEN SATURATION: 98 % | HEIGHT: 67 IN | WEIGHT: 198 LBS | RESPIRATION RATE: 12 BRPM

## 2020-03-19 DIAGNOSIS — R27.0 ATAXIA: ICD-10-CM

## 2020-03-19 DIAGNOSIS — G35 OPTIC NEURITIS DUE TO MULTIPLE SCLEROSIS (HCC): ICD-10-CM

## 2020-03-19 DIAGNOSIS — G35 MULTIPLE SCLEROSIS (HCC): Primary | ICD-10-CM

## 2020-03-19 DIAGNOSIS — R41.3 MEMORY LOSS: ICD-10-CM

## 2020-03-19 DIAGNOSIS — R53.1 WEAKNESS GENERALIZED: ICD-10-CM

## 2020-03-19 DIAGNOSIS — G82.50 QUADRIPARESIS (HCC): ICD-10-CM

## 2020-03-19 DIAGNOSIS — H46.9 OPTIC NEURITIS DUE TO MULTIPLE SCLEROSIS (HCC): ICD-10-CM

## 2020-03-19 NOTE — TELEPHONE ENCOUNTER
S/w Елена Sierra w/ [facility] - he will arrange for shipping Jef out tonight for tomorrow delivery. I confirmed pt's address  -    Co-pay is zero dollars. Auth/Case [id number]   7/30/19 - 8/28/20. Dr. Hank Lau -     Those letters are generated automatically and they do not research if an Iggy Yuen is already on file. Her insurance allows to be shipped in 30 day increments only. Last shipment - left on porch and does not require a signature.

## 2020-03-19 NOTE — PROGRESS NOTES
Consult    Subjective:     Jonas Morgan is a 44 y. o.right-handed Afro-American female seen for evaluation at the request of Dr. Riley Lentz of new problem of getting her medication of Aubagio approved, because insurance, keep sending her letter saying she has to switch to another medication, but we checked she was approved through August on her current medication which has continued to control her disease activity without clear evidence of recurrence. Patient does have MS relapsing remitting and continues to suffer with marked difficulty with fatigue, spastic paraplegia, cognitive impairment, visual disturbance, physical and mental fatigue, and having difficulty doing her work because she cannot keep up, and is always constantly exhausted. Has had no other side effect on medication, and is tolerating it well. She is not had a recent MRI scan in 5 years, and feels that she is getting worse, so she can do her job anymore, and was all her deficits, we will check another MRI scan to make sure she is not in the secondary progressive stage which sounds like she is, and physically she is disabled and probably should seek disability. Her job requires she stands all day and she is having difficulty doing that getting more arthritis and joint pain. We will also check her metabolic parameters looking for this no other cause for her clinical symptoms. ,  And make sure that there is no side effect of medication. She's had secondary progressive MS with relapsing form for years, and has had MRIs in the past showing brain lesions and cervical spine lesions and abnormal spinal fluid all diagnosed with MS. She's had MS over 25 years. She has bilateral weakness, cognitive issues in coordination issues and gait tissues and fatigue. The patient is clearly disabled. She has not had any acute exacerbations in the last months. She has had no new focal weakness sensory loss vision problems or cognitive issues that are new.  She has to ambulate with a cane because of her gait instability. She is very limited in her exertional activity due to the fatigue and weakness. She's had no other side effects on the medication. Patient also has decreased coordination in her hands, difficulty focusing her eyes and is mildly cognitively slow       She's had no other new medical problem complication or illnesses    Past Medical History:   Diagnosis Date    Alopecia     Essential hypertension     Multiple sclerosis ([address]     Neurological disorder       History reviewed. No pertinent surgical history. Family History   Problem Relation Age of Onset    Cancer Mother     Heart Disease Mother       Social History     Tobacco Use    Smoking status: Never Smoker    Smokeless tobacco: Never Used   Substance Use Topics    Alcohol use: No       Current Outpatient Medications   Medication Sig Dispense Refill    teriflunomide (AUBAGIO) 14 mg tablet TAKE 1 TABLET DAILY WITH DINNER 90 Tab 5    methylPREDNISolone (MEDROL DOSEPACK) 4 mg tablet Use as directed 1 Dose Pack 0    lisinopril-hydroCHLOROthiazide (PRINZIDE, ZESTORETIC) 10-12.5 mg per tablet TAKE 1 TABLET BY MOUTH EVERY DAY  0    clemastine 2.68 mg tab tablet Take 1 Tab by mouth two (2) times daily (after meals). 180 Tab 5    Cholecalciferol, Vitamin D3, (VITAMIN D3) 1,000 unit cap Take 2,000 Units by mouth.  ND-AQ-OH-Fe-Min-Lycopen-Lutein (CENTRUM) 0.4-162-18 mg tab Take  by mouth. Allergies   Allergen Reactions    Doxycycline Hives    Bystolic [Nebivolol] Other (comments)     Chest pain    Losartan Itching    Lotrel [Amlodipine-Benazepril] Swelling        Review of Systems:  A comprehensive review of systems was negative except for: Musculoskeletal: positive for myalgias, arthralgias and stiff joints  Neurological: positive for memory problems, paresthesia, coordination problems, gait problems and weakness     Objective:     I      NEUROLOGICAL EXAM:    Appearance:   The patient is well developed, well nourished, provides a fair history and is in no acute distress. Patient has multiple skin lesions, including nodules and papules and darkened rashes from previous lesions on her legs and arms and hands   Mental Status: Oriented to time, place and person and the president, but patient has difficulty doing serial sevens and remembering 3 of 3 words at 30 seconds, and doing math, and drawing a clock showing the time 10:50, and does have some mild cognitive impairment. . Mood and affect appropriate, but depressed. Cranial Nerves:   Intact visual fields. Fundi are benign, with mild bilateral optic pallor seen. HILDA, EOM's full, no nystagmus, no ptosis. Facial sensation is normal. Corneal reflexes are not tested. Facial movement is symmetric. Hearing is normal bilaterally. Palate is midline with normal sternocleidomastoid and trapezius muscles are normal. Tongue is midline. Neck is supple without meningismus or bruits  Temporal arteries are not tender or enlarged   Motor:  4/5 strength in upper and lower proximal and distal muscles, left Side seems weaker than the right . Normal bulk and increased tone. No fasciculations. Patient has moderate spastic weakness in all extremities with decreased coordination. Rapid alternating movement is slow in all extremities. Reflexes:   Deep tendon reflexes 3+/4 and symmetrical. No clear Babinski or clonus present   Sensory:   Normal to touch, pinprick and vibration mildly decreased in both lower extremities. Double simultaneous stimulation is intact   Gait:  Abnormal gait due to bilateral spastic weakness L>R, patient can walk without assistive devices, but is slow due to her spastic paraparetic gait, and walks with a marked limp. Tremor:   No tremor noted. Cerebellar:  Abnormal Romberg and tandem cerebellar signs present, and finger-nose-finger examination is unremarkable.    Neurovascular:  Normal heart sounds and regular rhythm, peripheral pulses decreased, and no carotid bruits. 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 Relapsing remitting multiple sclerosis with moderate to severe disability, but no acute exacerbation recently but needs repeat MRI scans is been 5 years since her last scans, will do without contrast because of her rash which he feels will start about a gadolinium though I keep telling her I doubted  Persistent rash, etiology unknown, recommended patient get back with dermatology to try to figure out what causes a rash  We will get metabolic parameters to check the safety of her Aubagio but she is tolerating it well without side effects  Patient will continue aubagio   We will continue to follow, and blood work was drawn today   She is to continue to work with her dermatologist and use Vistaril to use p.r.n. for itching  She is to continue her current medications and vitamins and vitamin D and remain mentally and physically active as possible. Followup visit in 6 months time. Metabolic parameters will check today    Signed By: Shani Lynch MD     March 19, 2020       This note will not be viewable in [address]

## 2020-03-19 NOTE — TELEPHONE ENCOUNTER
----- Message from Polly Kaiser sent at 3/19/2020  9:15 AM EDT -----  Regarding: DR. Noguera/Telephone  Patient return call    Caller's first and last name and relationship (if not the patient):Pt      Best contact number(s):[phone]      Whose call is being returned:Lily      Details to clarify the request:      Polly Kaiser

## 2020-03-19 NOTE — LETTER
3/19/20 Patient: Elvi Olmedo YOB: 1970 Date of Visit: 3/19/2020 Doug Mancia, [address] [id number] VIA Facsimile: [phone] Dear Doug Mancia MD, Thank you for referring Ms. Sandra Truong to [address] [place] [address] for evaluation. My notes for this consultation are attached. Consult Subjective:  
 
Elvi Olmedo is a 44 y. o.right-handed Afro-American female seen for evaluation at the request of Dr. Dorothy Owens of new problem of wanting to know if taking lisinopril could help her MS as she read somewhere or patient told her somewhere that it could help her get better because of her recent rat study showing it helped chemically induced MS. I advised her that a rest study gave no evidence for efficacy in humans, and she is on the medication and feels a little better, I told her that is fine because she needs something for her blood pressure anyway and if there is a chance it might conceivably help there is no reason she should not keep taking the medication because it is controlling her blood pressure. I told her about Novahist, and she wants to try that so new prescription sent in for that which does have some evidence that it might conceivably be disease modifying and show some improvement by re-myelinated the nerves. The patient has MS, and last time I saw her was over 2 years ago. She has had no recent follow-up. She had an MRI scan done of the cervical spine and brain with and without contrast, that showed no new active lesions except for a questionable slight enhancing lesion that T1C7 and she has not had any exacerbations, change in her neurologic condition, actually feels somewhat better no longer uses her cane for ambulation and works a few days a week part-time at the [employer].   We renewed her Aubagio which she is taking, and tolerating well without side effects and has done very well for the last several years. She thinks her rash started after gadolinium enhancement was given, so we will repeat her new MRI scans is been more than 3 years without dye because she is afraid to make her rash worse, but I told her she still has some minor rash which she does I seriously doubt has any relation to the gadolinium dye which was given over 3 years ago she should get back with her dermatologist to try to get a more definitive diagnosis. She's had chronic MS with relapsing form for years, and has had MRIs in the past showing brain lesions and cervical spine lesions and abnormal spinal fluid all diagnosed with MS. She's had MS over 23 years. She has bilateral weakness, cognitive issues in coordination issues and gait tissues and fatigue. The patient is clearly disabled. She has not had any acute exacerbations in the last months. She has had no new focal weakness sensory loss vision problems or cognitive issues that are new. She has to ambulate with a cane because of her gait instability. She is very limited in her exertional activity due to the fatigue and weakness. She's had no other side effects on the medication. Patient also has decreased coordination in her hands, difficulty focusing her eyes and is mildly cognitively slow She's had no other new medical problem complication or illnesses Past Medical History:  
Diagnosis Date  Alopecia  Essential hypertension  Multiple sclerosis ([facility] [address]  Neurological disorder History reviewed. No pertinent surgical history. Family History Problem Relation Age of Onset  Cancer Mother  Heart Disease Mother Social History Tobacco Use  Smoking status: Never Smoker  Smokeless tobacco: Never Used Substance Use Topics  Alcohol use: No  
   
Current Outpatient Medications Medication Sig Dispense Refill  teriflunomide (AUBAGIO) 14 mg tablet TAKE 1 TABLET DAILY WITH DINNER 90 Tab 5  methylPREDNISolone (MEDROL DOSEPACK) 4 mg tablet Use as directed 1 Dose Pack 0  
 lisinopril-hydroCHLOROthiazide (PRINZIDE, ZESTORETIC) 10-12.5 mg per tablet TAKE 1 TABLET BY MOUTH EVERY DAY  0  
 clemastine 2.68 mg tab tablet Take 1 Tab by mouth two (2) times daily (after meals). 180 Tab 5  Cholecalciferol, Vitamin D3, (VITAMIN D3) 1,000 unit cap Take 2,000 Units by mouth.  RI-HZ-PV-Fe-Min-Lycopen-Lutein (CENTRUM) 0.4-162-18 mg tab Take  by mouth. Allergies Allergen Reactions  Doxycycline Hives  Bystolic [Nebivolol] Other (comments) Chest pain  Losartan Itching  Lotrel [Amlodipine-Benazepril] Swelling Review of Systems: A comprehensive review of systems was negative except for: Musculoskeletal: positive for myalgias, arthralgias and stiff joints Neurological: positive for memory problems, paresthesia, coordination problems, gait problems and weakness Objective: I 
 
 
NEUROLOGICAL EXAM: 
 
Appearance: The patient is well developed, well nourished, provides a coherent history and is in no acute distress. Patient has multiple skin lesions, including nodules and papules and darkened rashes from previous lesions on her legs and arms and hands Mental Status: Oriented to time, place and person. Mood and affect appropriate. Cranial Nerves:   Intact visual fields. Fundi are benign, with mild bilateral optic pallor seen. HILDA, EOM's full, no nystagmus, no ptosis. Facial sensation is normal. Corneal reflexes are not tested. Facial movement is symmetric. Hearing is normal bilaterally. Palate is midline with normal sternocleidomastoid and trapezius muscles are normal. Tongue is midline. Neck is supple without meningismus or bruits Temporal arteries are not tender or enlarged Motor:  4/5 strength in upper and lower proximal and distal muscles, left Side seems weaker than the right . Normal bulk and increased tone.  No fasciculations. Patient has moderate spastic weakness in all extremities with decreased coordination. Reflexes:   Deep tendon reflexes 3+/4 and symmetrical. No clear Babinski or clonus present Sensory:   Normal to touch, pinprick and vibration mildly decreased in both lower extremities. Gait:  Abnormal gait due to bilateral spastic weakness L>R, patient can walk without assistive devices, but is slow due to her spastic paraparetic gait. Tremor:   No tremor noted. Cerebellar:  Abnormal Romberg and tandem cerebellar signs present. Neurovascular:  Normal heart sounds and regular rhythm, peripheral pulses decreased, and no carotid bruits. Assessment:  
 
Plan:  
 
New problem of questioning whether or not she should take lisinopril, and I told her as long as she controls her blood pressure that medication and want to try because of the rat study that is fine with me Patient given prescription for Novahist in view of recent article showing it might be disease modifying drug and risk and benefits all explained to the patient in general she was started on 2.68 mg twice a day. Relapsing remitting multiple sclerosis with moderate to severe disability, but no acute exacerbation recently but needs repeat MRI scans is been 3 years since her last scans, will do without contrast because of her rash which he feels will start about a gadolinium though I keep telling her I doubted Persistent rash, etiology unknown, recommended patient get back with dermatology to try to figure out what causes a rash We will get metabolic parameters to check the safety of her Aubagio but she is tolerating it well without side effects Patient will continue [place] We will continue to follow, and blood work was drawn today She is to continue to work with her dermatologist and use Vistaril to use p.r.n. for itching She is to continue her current medications and vitamins and vitamin D and remain mentally and physically active as possible. Followup visit in 6 months time. Metabolic parameters will check today Signed By: Dhruv Peraza MD   
 March 19, 2020 This note will not be viewable in [address] Consult Subjective:  
 
Mira Li is a 44 y. o.right-handed Afro-American female seen for evaluation at the request of Dr. Akshat Moore of new problem of getting her medication of Aubagio approved, because insurance, keep sending her letter saying she has to switch to another medication, but we checked she was approved through August on her current medication which has continued to control her disease activity without clear evidence of recurrence. Patient does have MS relapsing remitting and continues to suffer with marked difficulty with fatigue, spastic paraplegia, cognitive impairment, visual disturbance, physical and mental fatigue, and having difficulty doing her work because she cannot keep up, and is always constantly exhausted. Has had no other side effect on medication, and is tolerating it well. She is not had a recent MRI scan in 5 years, and feels that she is getting worse, so she can do her job anymore, and was all her deficits, we will check another MRI scan to make sure she is not in the secondary progressive stage which sounds like she is, and physically she is disabled and probably should seek disability. Her job requires she stands all day and she is having difficulty doing that getting more arthritis and joint pain. We will also check her metabolic parameters looking for this no other cause for her clinical symptoms. ,  And make sure that there is no side effect of medication. She's had secondary progressive MS with relapsing form for years, and has had MRIs in the past showing brain lesions and cervical spine lesions and abnormal spinal fluid all diagnosed with MS. She's had MS over 25 years.  She has bilateral weakness, cognitive issues in coordination issues and gait tissues and fatigue. The patient is clearly disabled. She has not had any acute exacerbations in the last months. She has had no new focal weakness sensory loss vision problems or cognitive issues that are new. She has to ambulate with a cane because of her gait instability. She is very limited in her exertional activity due to the fatigue and weakness. She's had no other side effects on the medication. Patient also has decreased coordination in her hands, difficulty focusing her eyes and is mildly cognitively slow She's had no other new medical problem complication or illnesses Past Medical History:  
Diagnosis Date  Alopecia  Essential hypertension  Multiple sclerosis ([facility] [place] [address]  Neurological disorder History reviewed. No pertinent surgical history. Family History Problem Relation Age of Onset  Cancer Mother  Heart Disease Mother Social History Tobacco Use  Smoking status: Never Smoker  Smokeless tobacco: Never Used Substance Use Topics  Alcohol use: No  
   
Current Outpatient Medications Medication Sig Dispense Refill  teriflunomide (AUBAGIO) 14 mg tablet TAKE 1 TABLET DAILY WITH DINNER 90 Tab 5  
 methylPREDNISolone (MEDROL DOSEPACK) 4 mg tablet Use as directed 1 Dose Pack 0  
 lisinopril-hydroCHLOROthiazide (PRINZIDE, ZESTORETIC) 10-12.5 mg per tablet TAKE 1 TABLET BY MOUTH EVERY DAY  0  
 clemastine 2.68 mg tab tablet Take 1 Tab by mouth two (2) times daily (after meals). 180 Tab 5  Cholecalciferol, Vitamin D3, (VITAMIN D3) 1,000 unit cap Take 2,000 Units by mouth.  IR-EZ-CD-Fe-Min-Lycopen-Lutein (CENTRUM) 0.4-162-18 mg tab Take  by mouth. Allergies Allergen Reactions  Doxycycline Hives  Bystolic [Nebivolol] Other (comments) Chest pain  Losartan Itching  Lotrel [Amlodipine-Benazepril] Swelling Review of Systems: A comprehensive review of systems was negative except for: Musculoskeletal: positive for myalgias, arthralgias and stiff joints Neurological: positive for memory problems, paresthesia, coordination problems, gait problems and weakness Objective: I 
 
 
NEUROLOGICAL EXAM: 
 
Appearance: The patient is well developed, well nourished, provides a fair history and is in no acute distress. Patient has multiple skin lesions, including nodules and papules and darkened rashes from previous lesions on her legs and arms and hands Mental Status: Oriented to time, place and person and the president, but patient has difficulty doing serial sevens and remembering 3 of 3 words at 30 seconds, and doing math, and drawing a clock showing the time 10:50, and does have some mild cognitive impairment. . Mood and affect appropriate, but depressed. Cranial Nerves:   Intact visual fields. Fundi are benign, with mild bilateral optic pallor seen. HILDA, EOM's full, no nystagmus, no ptosis. Facial sensation is normal. Corneal reflexes are not tested. Facial movement is symmetric. Hearing is normal bilaterally. Palate is midline with normal sternocleidomastoid and trapezius muscles are normal. Tongue is midline. Neck is supple without meningismus or bruits Temporal arteries are not tender or enlarged Motor:  4/5 strength in upper and lower proximal and distal muscles, left Side seems weaker than the right . Normal bulk and increased tone. No fasciculations. Patient has moderate spastic weakness in all extremities with decreased coordination. Rapid alternating movement is slow in all extremities. Reflexes:   Deep tendon reflexes 3+/4 and symmetrical. No clear Babinski or clonus present Sensory:   Normal to touch, pinprick and vibration mildly decreased in both lower extremities. Double simultaneous stimulation is intact Gait:  Abnormal gait due to bilateral spastic weakness L>R, patient can walk without assistive devices, but is slow due to her spastic paraparetic gait, and walks with a marked limp. Tremor:   No tremor noted. Cerebellar:  Abnormal Romberg and tandem cerebellar signs present, and finger-nose-finger examination is unremarkable. Neurovascular:  Normal heart sounds and regular rhythm, peripheral pulses decreased, and no carotid bruits. Assessment:  
 
Plan:  
 
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 Relapsing remitting multiple sclerosis with moderate to severe disability, but no acute exacerbation recently but needs repeat MRI scans is been 5 years since her last scans, will do without contrast because of her rash which he feels will start about a gadolinium though I keep telling her I doubted Persistent rash, etiology unknown, recommended patient get back with dermatology to try to figure out what causes a rash We will get metabolic parameters to check the safety of her Aubagio but she is tolerating it well without side effects Patient will continue [place] We will continue to follow, and blood work was drawn today She is to continue to work with her dermatologist and use Vistaril to use p.r.n. for itching She is to continue her current medications and vitamins and vitamin D and remain mentally and physically active as possible. Followup visit in 6 months time. Metabolic parameters will check today Signed By: Kody Mejia MD   
 March 19, 2020 This note will not be viewable in [address] If you have questions, please do not hesitate to call me. I look forward to following your patient along with you. Sincerely, Kody Mejia MD

## 2020-03-19 NOTE — PATIENT INSTRUCTIONS

## 2020-03-20 LAB
ALBUMIN SERPL-MCNC: 4.2 G/DL (ref 3.8–4.9)
ALBUMIN/GLOB SERPL: 1.4 {RATIO} (ref 1.2–2.2)
ALP SERPL-CCNC: 72 IU/L (ref 39–117)
ALT SERPL-CCNC: 19 IU/L (ref 0–32)
AST SERPL-CCNC: 16 IU/L (ref 0–40)
BASOPHILS # BLD AUTO: 0.1 X10E3/UL (ref 0–0.2)
BASOPHILS NFR BLD AUTO: 1 %
BILIRUB SERPL-MCNC: 0.2 MG/DL (ref 0–1.2)
BUN SERPL-MCNC: 11 MG/DL (ref 6–24)
BUN/CREAT SERPL: 15 (ref 9–23)
CALCIUM SERPL-MCNC: 9.6 MG/DL (ref 8.7–10.2)
CHLORIDE SERPL-SCNC: 102 MMOL/L (ref 96–106)
CO2 SERPL-SCNC: 21 MMOL/L (ref 20–29)
CREAT SERPL-MCNC: 0.71 MG/DL (ref 0.57–1)
EOSINOPHIL # BLD AUTO: 0.6 X10E3/UL (ref 0–0.4)
EOSINOPHIL NFR BLD AUTO: 6 %
ERYTHROCYTE [DISTWIDTH] IN BLOOD BY AUTOMATED COUNT: 12.4 % (ref 11.7–15.4)
GLOBULIN SER CALC-MCNC: 3 G/DL (ref 1.5–4.5)
GLUCOSE SERPL-MCNC: 106 MG/DL (ref 65–99)
HCT VFR BLD AUTO: 36.5 % (ref 34–46.6)
HGB BLD-MCNC: 12.8 G/DL (ref 11.1–15.9)
IMM GRANULOCYTES # BLD AUTO: 0 X10E3/UL (ref 0–0.1)
IMM GRANULOCYTES NFR BLD AUTO: 0 %
LYMPHOCYTES # BLD AUTO: 3.7 X10E3/UL (ref 0.7–3.1)
LYMPHOCYTES NFR BLD AUTO: 33 %
MCH RBC QN AUTO: 29.9 PG (ref 26.6–33)
MCHC RBC AUTO-ENTMCNC: 35.1 G/DL (ref 31.5–35.7)
MCV RBC AUTO: 85 FL (ref 79–97)
MONOCYTES # BLD AUTO: 0.7 X10E3/UL (ref 0.1–0.9)
MONOCYTES NFR BLD AUTO: 6 %
NEUTROPHILS # BLD AUTO: 6 X10E3/UL (ref 1.4–7)
NEUTROPHILS NFR BLD AUTO: 54 %
PLATELET # BLD AUTO: 345 X10E3/UL (ref 150–450)
POTASSIUM SERPL-SCNC: 3.7 MMOL/L (ref 3.5–5.2)
PROT SERPL-MCNC: 7.2 G/DL (ref 6–8.5)
RBC # BLD AUTO: 4.28 X10E6/UL (ref 3.77–5.28)
SODIUM SERPL-SCNC: 141 MMOL/L (ref 134–144)
WBC # BLD AUTO: 11.1 X10E3/UL (ref 3.4–10.8)

## 2020-03-23 ENCOUNTER — TELEPHONE (OUTPATIENT)
Dept: NEUROLOGY | Age: 50
End: 2020-03-23

## 2020-03-30 ENCOUNTER — TELEPHONE (OUTPATIENT)
Dept: NEUROLOGY | Age: 50
End: 2020-03-30

## 2020-07-16 ENCOUNTER — APPOINTMENT (OUTPATIENT)
Dept: MRI IMAGING | Age: 50
End: 2020-07-16
Attending: PSYCHIATRY & NEUROLOGY
Payer: COMMERCIAL

## 2020-07-16 ENCOUNTER — HOSPITAL ENCOUNTER (OUTPATIENT)
Dept: MRI IMAGING | Age: 50
Discharge: HOME | End: 2020-07-16
Attending: PSYCHIATRY & NEUROLOGY
Payer: COMMERCIAL

## 2020-07-16 DIAGNOSIS — G35 MULTIPLE SCLEROSIS (HCC): ICD-10-CM

## 2020-07-16 DIAGNOSIS — R41.3 MEMORY LOSS: ICD-10-CM

## 2020-07-16 DIAGNOSIS — H46.9 OPTIC NEURITIS DUE TO MULTIPLE SCLEROSIS (HCC): ICD-10-CM

## 2020-07-16 DIAGNOSIS — R53.1 WEAKNESS GENERALIZED: ICD-10-CM

## 2020-07-16 DIAGNOSIS — R27.0 ATAXIA: ICD-10-CM

## 2020-07-16 DIAGNOSIS — G35 OPTIC NEURITIS DUE TO MULTIPLE SCLEROSIS (HCC): ICD-10-CM

## 2020-07-16 DIAGNOSIS — G82.50 QUADRIPARESIS (HCC): ICD-10-CM

## 2020-07-16 PROCEDURE — 70551 MRI BRAIN STEM W/O DYE: CPT

## 2020-07-16 PROCEDURE — 72141 MRI NECK SPINE W/O DYE: CPT

## 2020-07-17 ENCOUNTER — DOCUMENTATION ONLY (OUTPATIENT)
Dept: NEUROLOGY | Age: 50
End: 2020-07-17

## 2020-07-17 NOTE — PROGRESS NOTES
I called the patient, left message MRI scans look stable, less lesions than before, but still MS lesions present, no contrast given so cannot really separate out new wounds, she does have progression of arthritis at the C3-4 level which we will follow conservatively.   Patient says that is great, she is doing well, continue current therapy

## 2020-08-18 ENCOUNTER — TELEPHONE (OUTPATIENT)
Dept: NEUROLOGY | Age: 50
End: 2020-08-18

## 2020-08-18 NOTE — TELEPHONE ENCOUNTER
Aubagio approval     Approvedtoday  [id number]; Review Type:Prior Auth; Coverage Start Date:07/19/2020; Coverage End Date:08/18/2021;    Faxed to [employer].

## 2020-08-18 NOTE — TELEPHONE ENCOUNTER
Aubagio renewal sent to [facility] via [facility].      Key: [id number] - [place] Case ID: [id number]

## 2020-10-09 ENCOUNTER — TELEPHONE (OUTPATIENT)
Dept: NEUROLOGY | Age: 50
End: 2020-10-09

## 2020-10-09 NOTE — TELEPHONE ENCOUNTER
rec'd request for Aubagio renewal from [employer]. The Chyna Harris is valid from 7/19/20  and was done 8/18/20. They sent an old expiration from 2019. Refaxed to [place] at [employer] to advise of already approved and same insurance since April 2019.

## 2020-11-30 ENCOUNTER — OFFICE VISIT (OUTPATIENT)
Dept: NEUROLOGY | Age: 50
End: 2020-11-30
Payer: COMMERCIAL

## 2020-11-30 VITALS
OXYGEN SATURATION: 93 % | TEMPERATURE: 98 F | HEIGHT: 67 IN | RESPIRATION RATE: 16 BRPM | SYSTOLIC BLOOD PRESSURE: 165 MMHG | WEIGHT: 198 LBS | HEART RATE: 87 BPM | DIASTOLIC BLOOD PRESSURE: 90 MMHG | BODY MASS INDEX: 31.08 KG/M2

## 2020-11-30 DIAGNOSIS — H46.9 OPTIC NEURITIS DUE TO MULTIPLE SCLEROSIS (HCC): ICD-10-CM

## 2020-11-30 DIAGNOSIS — R27.0 ATAXIA: ICD-10-CM

## 2020-11-30 DIAGNOSIS — M54.16 LUMBAR BACK PAIN WITH RADICULOPATHY AFFECTING LEFT LOWER EXTREMITY: ICD-10-CM

## 2020-11-30 DIAGNOSIS — R53.1 WEAKNESS GENERALIZED: ICD-10-CM

## 2020-11-30 DIAGNOSIS — G35 OPTIC NEURITIS DUE TO MULTIPLE SCLEROSIS (HCC): ICD-10-CM

## 2020-11-30 DIAGNOSIS — G35 MULTIPLE SCLEROSIS (HCC): Primary | ICD-10-CM

## 2020-11-30 DIAGNOSIS — M47.22 CERVICAL RADICULOPATHY DUE TO DEGENERATIVE JOINT DISEASE OF SPINE: ICD-10-CM

## 2020-11-30 DIAGNOSIS — M54.16 LUMBAR BACK PAIN WITH RADICULOPATHY AFFECTING RIGHT LOWER EXTREMITY: ICD-10-CM

## 2020-11-30 DIAGNOSIS — G82.50 QUADRIPARESIS (HCC): ICD-10-CM

## 2020-11-30 DIAGNOSIS — R41.3 MEMORY LOSS: ICD-10-CM

## 2020-11-30 PROCEDURE — 99214 OFFICE O/P EST MOD 30 MIN: CPT | Performed by: PSYCHIATRY & NEUROLOGY

## 2020-11-30 RX ORDER — DULOXETIN HYDROCHLORIDE 30 MG/1
30 CAPSULE, DELAYED RELEASE ORAL DAILY
Qty: 30 CAP | Refills: 5 | Status: SHIPPED | OUTPATIENT
Start: 2020-11-30 | End: 2021-01-11 | Stop reason: SDUPTHER

## 2020-11-30 NOTE — LETTER
11/30/20 Patient: Cheryl Burciaga YOB: 1970 Date of Visit: 11/30/2020 Chip Gee, [address] [address] VIA Facsimile: [phone] Dear Chip Gee MD, Thank you for referring Ms. Ed Orosco to [address] [place] [address] for evaluation. My notes for this consultation are attached. Consult Subjective:  
 
Cheryl Burciaga is a 44 y. o.right-handed Afro-American female seen for evaluation at the request of Dr. Parmjit Hanna of new problem of having marked increased in pain in her legs, particular when she is standing up and bearing weight for period of time, with the pain being an aching sensation from the knees down, occasionally into the left hip and radiating from the back region, left side greater than right, and occasionally even having some intermittent radiating into her left arm. It all sounds like degenerative arthritis and maybe some mild radiculopathy but she is very upset about it, and since she never has pain when she is laying flat, and occasionally when she is sitting the pain will occur, but is mainly when she is standing and I told her that probably not her MS, because it would be more persistent. She just had an MRI scan done July 2020 that showed stable disease since 2014 of the brain, and improved disease in the cervical spine consistent with her MS, but no new lesions, but did show some mild arthritis. Patient is currently on Aubagio for her MS. She tolerated it well without clear side effects. Patient is still trying to work 2 jobs. Patient does have MS relapsing remitting and continues to suffer with marked difficulty with fatigue, spastic paraplegia, cognitive impairment, visual disturbance, physical and mental fatigue, and having difficulty doing her work because she cannot keep up, and is always constantly exhausted.   Has had no other side effect on medication, and is tolerating it well. She is not had a recent MRI scan in 5 years, and feels that she is getting worse, so she can do her job anymore, and her job requires she stands all day and she is having difficulty doing that getting more arthritis and joint pain. We will also check her metabolic parameters and connective tissue parameters looking for other cause for her clinical symptoms. 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 She's had MS over 25 years. She has bilateral weakness, cognitive issues in coordination issues and gait tissues and fatigue. The patient is clearly disabled. She has not had any acute exacerbations in the last months. She has had no new focal weakness sensory loss vision problems or cognitive issues that are new. She has to ambulate with a cane because of her gait instability. She is very limited in her exertional activity due to the fatigue and weakness. She's had no other side effects on the medication. Patient also has decreased coordination in her hands, difficulty focusing her eyes and is mildly cognitively slow She's had no other new medical problem complication or illnesses Past Medical History:  
Diagnosis Date  Alopecia  Essential hypertension  Multiple sclerosis ([facility] [address]  Neurological disorder History reviewed. No pertinent surgical history. Family History Problem Relation Age of Onset  Cancer Mother   
     kidney  Heart Disease Mother  Kidney Disease Mother  Diabetes Mother  Hypertension Mother  Other Father GSW  Heart Disease Brother  Hypertension Brother Social History Tobacco Use  Smoking status: Never Smoker  Smokeless tobacco: Never Used Substance Use Topics  Alcohol use: No  
   
Current Outpatient Medications Medication Sig Dispense Refill  teriflunomide (Aubagio) 14 mg tablet TAKE 1 TABLET DAILY WITH DINNER 90 Tab 5  DULoxetine (CYMBALTA) 30 mg capsule Take 1 Cap by mouth daily. 30 Cap 5  
 lisinopril-hydroCHLOROthiazide (PRINZIDE, ZESTORETIC) 10-12.5 mg per tablet TAKE 1 TABLET BY MOUTH EVERY DAY  0  
 Cholecalciferol, Vitamin D3, (VITAMIN D3) 1,000 unit cap Take 2,000 Units by mouth. Allergies Allergen Reactions  Doxycycline Hives  Bystolic [Nebivolol] Other (comments) Chest pain  Losartan Itching  Lotrel [Amlodipine-Benazepril] Swelling Review of Systems: A comprehensive review of systems was negative except for: Musculoskeletal: positive for myalgias, arthralgias and stiff joints Neurological: positive for memory problems, paresthesia, coordination problems, gait problems and weakness Objective: I 
 
 
NEUROLOGICAL EXAM: 
 
Appearance: The patient is well developed, well nourished, provides a fair history and is in no acute distress. Patient has multiple skin lesions, including nodules and papules and darkened rashes from previous lesions on her legs and arms and hands Mental Status: Oriented to time, place and person and the president, but patient has difficulty doing serial sevens and remembering 3 of 3 words at 30 seconds, and doing math, and drawing a clock showing the time 10:50, and does have some mild cognitive impairment. . Mood and affect appropriate, but depressed. Cranial Nerves:   Intact visual fields. Fundi are benign, with mild bilateral optic pallor seen. HILDA, EOM's full, no nystagmus, no ptosis. Facial sensation is normal. Corneal reflexes are not tested. Facial movement is symmetric. Hearing is normal bilaterally. Palate is midline with normal sternocleidomastoid and trapezius muscles are normal. Tongue is midline. Neck is supple without meningismus or bruits Temporal arteries are not tender or enlarged Motor:  4/5 strength in upper and lower proximal and distal muscles, left Side seems weaker than the right . Normal bulk and increased tone. No fasciculations. Patient has moderate spastic weakness in all extremities with decreased coordination. Rapid alternating movement is slow in all extremities. Reflexes:   Deep tendon reflexes 3+/4 and symmetrical. No clear Babinski or clonus present Sensory:   Normal to touch, pinprick and vibration mildly decreased in both lower extremities. Double simultaneous stimulation is intact Gait:  Abnormal gait due to bilateral spastic weakness L>R, patient can walk without assistive devices, but is slow due to her spastic paraparetic gait, and walks with a marked limp. Tremor:   No tremor noted. Cerebellar:  Abnormal Romberg and tandem cerebellar signs present, and finger-nose-finger examination is unremarkable. Neurovascular:  Normal heart sounds and regular rhythm, peripheral pulses decreased, and no carotid bruits. Assessment:  
 
Plan:  
 
Patient with new problem of worsening leg pain and left arm pain that sounds like it might be some radicular component so we will check an EMG study, ensure there is no neuromuscular disease, and making sure there is no radicular component, and x-ray her neck and back, and she may need physical therapy if all that fails, will also check multiple connective tissue panels. Patient with slowly worsening MS, and can no longer do her job well, and clearly is disabled, we advised her to apply for disability because of her cognitive difficulties, mental impairment, increasing visual symptoms secondary to her optic neuropathies, and marked mental and physical fatigue and quadriplegia, but she is still trying to work 2 jobs. Sadie Joaquin Relapsing remitting multiple sclerosis with moderate to severe disability, but no acute exacerbation recently but patient had recent cervical MRI scan July 2020 that showed stable disease in the cervical spine, and mild arthritis, and she has had an MRI of the brain July 2020 that also showed stable disease since 2014. We will get metabolic parameters to check the safety of her Aubagio but she is tolerating it well without side effects Patient will continue [place] We will continue to follow, and blood work was drawn today She is to continue to work with her dermatologist and use Vistaril to use p.r.n. for itching She is to continue her current medications and vitamins and vitamin D and remain mentally and physically active as possible. Followup visit in 6 months time. Metabolic parameters will check today Signed By: Jamilah Junior MD   
 November 30, 2020 If you have questions, please do not hesitate to call me. I look forward to following your patient along with you. Sincerely, Jamilah Junior MD

## 2020-12-01 NOTE — PROGRESS NOTES
Consult    Subjective:     Denis Tobar is a 44 y. o.right-handed Afro-American female seen for evaluation at the request of Dr. Arely Calderon of new problem of having marked increased in pain in her legs, particular when she is standing up and bearing weight for period of time, with the pain being an aching sensation from the knees down, occasionally into the left hip and radiating from the back region, left side greater than right, and occasionally even having some intermittent radiating into her left arm. It all sounds like degenerative arthritis and maybe some mild radiculopathy but she is very upset about it, and since she never has pain when she is laying flat, and occasionally when she is sitting the pain will occur, but is mainly when she is standing and I told her that probably not her MS, because it would be more persistent. She just had an MRI scan done July 2020 that showed stable disease since 2014 of the brain, and improved disease in the cervical spine consistent with her MS, but no new lesions, but did show some mild arthritis. Patient is currently on Aubagio for her MS. She tolerated it well without clear side effects. Patient is still trying to work 2 jobs. Patient does have MS relapsing remitting and continues to suffer with marked difficulty with fatigue, spastic paraplegia, cognitive impairment, visual disturbance, physical and mental fatigue, and having difficulty doing her work because she cannot keep up, and is always constantly exhausted. Has had no other side effect on medication, and is tolerating it well. She is not had a recent MRI scan in 5 years, and feels that she is getting worse, so she can do her job anymore, and her job requires she stands all day and she is having difficulty doing that getting more arthritis and joint pain. We will also check her metabolic parameters and connective tissue parameters looking for other cause for her clinical symptoms.  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 She's had MS over 25 years. She has bilateral weakness, cognitive issues in coordination issues and gait tissues and fatigue. The patient is clearly disabled. She has not had any acute exacerbations in the last months. She has had no new focal weakness sensory loss vision problems or cognitive issues that are new. She has to ambulate with a cane because of her gait instability. She is very limited in her exertional activity due to the fatigue and weakness. She's had no other side effects on the medication. Patient also has decreased coordination in her hands, difficulty focusing her eyes and is mildly cognitively slow       She's had no other new medical problem complication or illnesses    Past Medical History:   Diagnosis Date    Alopecia     Essential hypertension     Multiple sclerosis ([facility] [address]     Neurological disorder       History reviewed. No pertinent surgical history. Family History   Problem Relation Age of Onset    Cancer Mother         kidney    Heart Disease Mother     Kidney Disease Mother     Diabetes Mother     Hypertension Mother     Other Father         GSW    Heart Disease Brother     Hypertension Brother       Social History     Tobacco Use    Smoking status: Never Smoker    Smokeless tobacco: Never Used   Substance Use Topics    Alcohol use: No       Current Outpatient Medications   Medication Sig Dispense Refill    teriflunomide (Aubagio) 14 mg tablet TAKE 1 TABLET DAILY WITH DINNER 90 Tab 5    DULoxetine (CYMBALTA) 30 mg capsule Take 1 Cap by mouth daily. 30 Cap 5    lisinopril-hydroCHLOROthiazide (PRINZIDE, ZESTORETIC) 10-12.5 mg per tablet TAKE 1 TABLET BY MOUTH EVERY DAY  0    Cholecalciferol, Vitamin D3, (VITAMIN D3) 1,000 unit cap Take 2,000 Units by mouth.           Allergies   Allergen Reactions    Doxycycline Hives    Bystolic [Nebivolol] Other (comments)     Chest pain    Losartan Itching    Lotrel [Amlodipine-Benazepril] Swelling        Review of Systems:  A comprehensive review of systems was negative except for: Musculoskeletal: positive for myalgias, arthralgias and stiff joints  Neurological: positive for memory problems, paresthesia, coordination problems, gait problems and weakness     Objective:     I      NEUROLOGICAL EXAM:    Appearance: The patient is well developed, well nourished, provides a fair history and is in no acute distress. Patient has multiple skin lesions, including nodules and papules and darkened rashes from previous lesions on her legs and arms and hands   Mental Status: Oriented to time, place and person and the president, but patient has difficulty doing serial sevens and remembering 3 of 3 words at 30 seconds, and doing math, and drawing a clock showing the time 10:50, and does have some mild cognitive impairment. . Mood and affect appropriate, but depressed. Cranial Nerves:   Intact visual fields. Fundi are benign, with mild bilateral optic pallor seen. HILDA, EOM's full, no nystagmus, no ptosis. Facial sensation is normal. Corneal reflexes are not tested. Facial movement is symmetric. Hearing is normal bilaterally. Palate is midline with normal sternocleidomastoid and trapezius muscles are normal. Tongue is midline. Neck is supple without meningismus or bruits  Temporal arteries are not tender or enlarged   Motor:  4/5 strength in upper and lower proximal and distal muscles, left Side seems weaker than the right . Normal bulk and increased tone. No fasciculations. Patient has moderate spastic weakness in all extremities with decreased coordination. Rapid alternating movement is slow in all extremities.    Reflexes:   Deep tendon reflexes 3+/4 and symmetrical. No clear Babinski or clonus present   Sensory:   Normal to touch, pinprick and vibration mildly decreased in both lower extremities. Double simultaneous stimulation is intact   Gait:  Abnormal gait due to bilateral spastic weakness L>R, patient can walk without assistive devices, but is slow due to her spastic paraparetic gait, and walks with a marked limp. Tremor:   No tremor noted. Cerebellar:  Abnormal Romberg and tandem cerebellar signs present, and finger-nose-finger examination is unremarkable. Neurovascular:  Normal heart sounds and regular rhythm, peripheral pulses decreased, and no carotid bruits. Assessment:     Plan:     Patient with new problem of worsening leg pain and left arm pain that sounds like it might be some radicular component so we will check an EMG study, ensure there is no neuromuscular disease, and making sure there is no radicular component, and x-ray her neck and back, and she may need physical therapy if all that fails, will also check multiple connective tissue panels. Patient with slowly worsening MS, and can no longer do her job well, and clearly is disabled, we advised her to apply for disability because of her cognitive difficulties, mental impairment, increasing visual symptoms secondary to her optic neuropathies, and marked mental and physical fatigue and quadriplegia, but she is still trying to work 2 jobs. .  Relapsing remitting multiple sclerosis with moderate to severe disability, but no acute exacerbation recently but patient had recent cervical MRI scan July 2020 that showed stable disease in the cervical spine, and mild arthritis, and she has had an MRI of the brain July 2020 that also showed stable disease since 2014.   We will get metabolic parameters to check the safety of her Aubagio but she is tolerating it well without side effects  Patient will continue aubagio   We will continue to follow, and blood work was drawn today   She is to continue to work with her dermatologist and use Vistaril to use p.r.n. for itching  She is to continue her current medications and vitamins and vitamin D and remain mentally and physically active as possible. Followup visit in 6 months time.   Metabolic parameters will check today    Signed By: Kam Casper MD     November 30, 2020

## 2020-12-09 ENCOUNTER — HOSPITAL ENCOUNTER (OUTPATIENT)
Dept: GENERAL RADIOLOGY | Age: 50
Discharge: HOME | End: 2020-12-09
Payer: COMMERCIAL

## 2020-12-09 ENCOUNTER — OFFICE VISIT (OUTPATIENT)
Dept: NEUROLOGY | Age: 50
End: 2020-12-09
Payer: COMMERCIAL

## 2020-12-09 VITALS — TEMPERATURE: 97.5 F

## 2020-12-09 DIAGNOSIS — M54.16 LUMBAR BACK PAIN WITH RADICULOPATHY AFFECTING RIGHT LOWER EXTREMITY: ICD-10-CM

## 2020-12-09 DIAGNOSIS — M47.22 CERVICAL RADICULOPATHY DUE TO DEGENERATIVE JOINT DISEASE OF SPINE: ICD-10-CM

## 2020-12-09 DIAGNOSIS — M54.16 LUMBAR BACK PAIN WITH RADICULOPATHY AFFECTING LEFT LOWER EXTREMITY: ICD-10-CM

## 2020-12-09 DIAGNOSIS — R27.0 ATAXIA: ICD-10-CM

## 2020-12-09 DIAGNOSIS — R53.1 WEAKNESS GENERALIZED: ICD-10-CM

## 2020-12-09 DIAGNOSIS — H46.9 OPTIC NEURITIS DUE TO MULTIPLE SCLEROSIS (HCC): ICD-10-CM

## 2020-12-09 DIAGNOSIS — G35 OPTIC NEURITIS DUE TO MULTIPLE SCLEROSIS (HCC): ICD-10-CM

## 2020-12-09 DIAGNOSIS — G82.50 QUADRIPARESIS (HCC): ICD-10-CM

## 2020-12-09 DIAGNOSIS — G35 MULTIPLE SCLEROSIS (HCC): ICD-10-CM

## 2020-12-09 DIAGNOSIS — G56.02 CARPAL TUNNEL SYNDROME OF LEFT WRIST: ICD-10-CM

## 2020-12-09 DIAGNOSIS — R41.3 MEMORY LOSS: ICD-10-CM

## 2020-12-09 DIAGNOSIS — G35 MULTIPLE SCLEROSIS (HCC): Primary | ICD-10-CM

## 2020-12-09 PROCEDURE — 95886 MUSC TEST DONE W/N TEST COMP: CPT | Performed by: PSYCHIATRY & NEUROLOGY

## 2020-12-09 PROCEDURE — 72110 X-RAY EXAM L-2 SPINE 4/>VWS: CPT

## 2020-12-09 PROCEDURE — 72052 X-RAY EXAM NECK SPINE 6/>VWS: CPT

## 2020-12-09 PROCEDURE — 95913 NRV CNDJ TEST 13/> STUDIES: CPT | Performed by: PSYCHIATRY & NEUROLOGY

## 2020-12-09 NOTE — PROGRESS NOTES
Patient is EMG showed mild borderline carpal tunnel syndrome on the left side, but no other significant evidence of neuromuscular disease, radiculopathy or myopathy or neuropathy. Patient encouraged to wear a wrist splint on her left hand at night, and continue her work-up and evaluation as ordered, including getting her x-rays done today. She is to continue her treatment program, and follow-up in 2 months time in the office, and call us if there is any change in her condition.

## 2020-12-09 NOTE — LETTER
12/9/2020 1:10 PM 
 
Patient:  Chip Gabriel YOB: 1970 Date of Visit: 12/9/2020 Dear No Recipients: Thank you for referring Ms. Fran Guo to me for evaluation/treatment. Below are the relevant portions of my assessment and plan of care. Patient is EMG showed mild borderline carpal tunnel syndrome on the left side, but no other significant evidence of neuromuscular disease, radiculopathy or myopathy or neuropathy. Patient encouraged to wear a wrist splint on her left hand at night, and continue her work-up and evaluation as ordered, including getting her x-rays done today. She is to continue her treatment program, and follow-up in 2 months time in the office, and call us if there is any change in her condition. If you have questions, please do not hesitate to call me. I look forward to following Ms. Ana Johns along with you. Sincerely, [facility]

## 2020-12-09 NOTE — PROCEDURES
Electrodiagnostic Study Report  Test Date:  2020    Patient: Nolvia Rahman : 1970 Physician: Anibal Jiménez MD   Sex: Female Tech: Facundo Perla, EMG Tech Ref Phys:      Technician: Facundo Perla    Clinical Indication: Patient is a 57-year-old with multiple sclerosis, and severe leg and arm pain and muscle pain and numbness and neck pain and back pain for EMG study to rule out radiculopathy, myopathy, entrapment neuropathy, or polyneuropathy. Neuro exam: Patient has mild spastic quadriparesis with her MS, and increased muscle tone generalized but normal bulk. No clear Babinski or clonus present, deep tendon reflexes are slightly brisk. Sensory examination to pin and double simultaneous stimulation is intact. Cranial nerves II through XII intact. Mental status normal.  Patient walks with a unsteady gait, slightly wide-based and spastic and has abnormal Romberg and tandem with finger-nose-finger examination is slow but symmetric. Impression: This study shows electrophysiologic evidence of a mild compressive neuropathy of the median nerve at the wrist on the left side, consistent with carpal tunnel syndrome as manifest by the mild prolongation of the distal motor and sensory latencies of the median nerve, but showing no evidence of denervation or axonal changes. There was no significant evidence of other motor radiculopathy, primary myopathy, neuropathy, or other neuromuscular disease. The decreased amplitude of the left ulnar sensory latency was most likely more technical.  Clinical correlation recommended, and correlation with imaging modalities metabolic studies may also be of further diagnostic value if clinically indicated. EMG & NCV Findings:  Evaluation of the Left median motor nerve showed prolonged distal onset latency (4.7 ms). The Left ulnar motor nerve showed reduced amplitude (5.4 mV) and decreased conduction velocity (Wrist-Abd Dig Minimi, 28 m/s).   The Left median sensory nerve showed prolonged distal peak latency (4.5 ms). All remaining nerves (as indicated in the following tables) were within normal limits.         Nerve Conduction Studies  Anti Sensory Summary Table     Site NR Peak (ms) Norm Peak (ms) O-P Amp (µV) Norm O-P Amp Site1 Site2 Dist (cm)   Left Median Anti Sensory (2nd Digit)  34°C   Wrist    4.5 <4 15.6 >11 Wrist 2nd Digit 14.0   Left Radial Anti Sensory (Base 1st Digit)  34°C   Wrist    2.0 <2.8 27.6 >11 Wrist Base 1st Digit 10.0   Left Sup Fibular Anti Sensory (Lat ankle)  34°C   Lower leg    2.1 <4.5 19.8 >5 Lower leg Lat ankle 10.0   Right Sup Fibular Anti Sensory (Lat ankle)  34°C   Lower leg    2.7 <4.5 17.3 >5 Lower leg Lat ankle 10.0   Left Sural Anti Sensory (Lat Mall)  34°C   Calf    2.6 <4.5 12.5 >4.0 Calf Lat Mall 14.0   Right Sural Anti Sensory (Lat Mall)  34°C   Calf    2.7 <4.5 9.2 >4.0 Calf Lat Mall 14.0   Left Ulnar Anti Sensory (5th Digit)  34°C   Wrist    3.2 <4.0 16.4 >10 Wrist 5th Digit 14.0     Motor Summary Table     Site NR Onset (ms) Norm Onset (ms) O-P* Amp (mV) Norm O-P Amp P-T Amp (mV) Site1 Site2 Dist (cm) Riley (m/s)   Left Fibular Motor (Ext Dig Brev)  34°C   Ankle    3.7 <6.5 7.9 >2.6  Ankle Ext Dig Brev 8.0 22   B Fib    11.1  7.1   B Fib Ankle 33.0 45   Poplt    12.6  6.4   Poplt B Fib 10.0 67   Right Fibular Motor (Ext Dig Brev)  34°C   Ankle    3.1 <6.5 8.6 >2.6  Ankle Ext Dig Brev 8.0 26   B Fib    10.8  8.1   B Fib Ankle 35.0 45   Poplt    12.4  7.8   Poplt B Fib 10.0 63   Left Median Motor (Abd Poll Brev)  34°C   Wrist    4.7 <4.5 7.5 >4.1  Wrist Abd Poll Brev 24.0 51   Elbow    9.4  6.4   Elbow Wrist 0.0    Left Tibial Motor (Abd Dougherty Brev)  34°C   Ankle    4.7 <6.1 10.0 >5.3  Ankle Abd Dougherty Brev 8.0 17   Knee    13.4  9.0   Knee Ankle 38.0 44   Right Tibial Motor (Abd Dougherty Brev)  34°C   Ankle    4.8 <6.1 9.5 >5.3  Ankle Abd Dougherty Brev 8.0 17   Knee    14.8  8.2   Knee Ankle 39.0 39   Left Ulnar Motor (Abd Dig Minimi)  34°C Wrist    2.9 <3.1 5.4 >7.0  Wrist Abd Dig Minimi 8.0 28   B Elbow    7.7  5.6   B Elbow Wrist 24.0 50   A Elbow    9.1  5.2   A Elbow B Elbow 10.0 71     EMG     Side Muscle Nerve Root Ins Act Fibs Psw Recrt Duration Amp Poly Comment   Right AntTibialis Dp Br Peron L4-5 Nml Nml Nml Nml Nml Nml Nml    Right MedGastroc Tibial S1-2 Nml Nml Nml Nml Nml Nml Nml    Right VastusLat Femoral L2-4 Nml Nml Nml Nml Nml Nml Nml    Right Lower Lumb Parasp Rami L5,S1 Nml Nml Nml Nml Nml Nml Nml    Right Ext Dig Brev Dp Br Peron L5, S1 Nml Nml Nml Nml Nml Nml Nml    Right AbdHallucis MedPlantar S1-2 Nml Nml Nml Nml Nml Nml Nml    Left AntTibialis Dp Br Peron L4-5 Nml Nml Nml Nml Nml Nml Nml    Left MedGastroc Tibial S1-2 Nml Nml Nml Nml Nml Nml Nml    Left VastusLat Femoral L2-4 Nml Nml Nml Nml Nml Nml Nml    Left Lower Lumb Parasp Rami L5,S1 Nml Nml Nml Nml Nml Nml Nml    Left Ext Dig Brev Dp Br Peron L5, S1 Nml Nml Nml Nml Nml Nml Nml    Left AbdHallucis MedPlantar S1-2 Nml Nml Nml Nml Nml Nml Nml    Left 1stDorInt Ulnar C8-T1 Nml Nml Nml Nml Nml Nml Nml    Left Abd Poll Brev Median C8-T1 Nml Nml Nml Nml Nml Nml Nml    Left Biceps Musculocut C5-6 Nml Nml Nml Nml Nml Nml Nml    Left Triceps Radial C6-7-8 Nml Nml Nml Nml Nml Nml Nml    Left Deltoid Axillary C5-6 Nml Nml Nml Nml Nml Nml Nml    Left Lower Cerv Parasp Rami C7,T1 Nml Nml Nml Nml Nml Nml Nml        Waveforms:                                       __________________  Rocio Oviedo M.D.

## 2020-12-16 ENCOUNTER — TELEPHONE (OUTPATIENT)
Dept: NEUROLOGY | Age: 50
End: 2020-12-16

## 2020-12-22 DIAGNOSIS — M47.22 CERVICAL RADICULOPATHY DUE TO DEGENERATIVE JOINT DISEASE OF SPINE: Primary | ICD-10-CM

## 2020-12-22 DIAGNOSIS — G35 MULTIPLE SCLEROSIS (HCC): ICD-10-CM

## 2020-12-22 DIAGNOSIS — M54.16 LUMBAR BACK PAIN WITH RADICULOPATHY AFFECTING RIGHT LOWER EXTREMITY: ICD-10-CM

## 2020-12-22 RX ORDER — METHYLPREDNISOLONE 4 MG/1
TABLET ORAL
Qty: 1 DOSE PACK | Refills: 0 | Status: SHIPPED | OUTPATIENT
Start: 2020-12-22 | End: 2021-03-01 | Stop reason: ALTCHOICE

## 2020-12-29 LAB
14.3.3 ETA, RHEUM. ARTHRITIS: <0.2 NG/ML
ANTI NEURONAL CELL AB METHOD: NORMAL
CCP IGA+IGG SERPL IA-ACNC: <20 UNITS
CENTROMERE B AB SER-ACNC: <0.2 AI (ref 0–0.9)
CHROMATIN AB SERPL-ACNC: <0.2 AI (ref 0–0.9)
CK SERPL-CCNC: 266 U/L (ref 32–182)
DSDNA AB SER-ACNC: 2 IU/ML (ref 0–9)
ENA JO1 AB SER-ACNC: <0.2 AI (ref 0–0.9)
ENA RNP AB SER-ACNC: <0.2 AI (ref 0–0.9)
ENA SCL70 AB SER-ACNC: <0.2 AI (ref 0–0.9)
ENA SM AB SER-ACNC: <0.2 AI (ref 0–0.9)
ENA SM+RNP AB SER-ACNC: <0.2 AI (ref 0–0.9)
ENA SS-A AB SER-ACNC: <0.2 AI (ref 0–0.9)
ENA SS-B AB SER-ACNC: <0.2 AI (ref 0–0.9)
ERYTHROCYTE [SEDIMENTATION RATE] IN BLOOD BY WESTERGREN METHOD: 56 MM/HR (ref 0–40)
IGA SERPL-MCNC: 423 MG/DL (ref 87–352)
IGG SERPL-MCNC: 1174 MG/DL (ref 586–1602)
IGM SERPL-MCNC: 20 MG/DL (ref 26–217)
INTERPRETATION, 811987: NORMAL
NEURONAL AB SER IA-ACNC: 6 UNITS (ref 0–54)
PROT PATTERN SERPL IFE-IMP: ABNORMAL
RHEUMATOID FACT SERPL-ACNC: <14 UNITS/ML
RIBOSOMAL P AB SER-ACNC: <0.2 AI (ref 0–0.9)
SEE BELOW:, 164879: NORMAL

## 2021-01-11 DIAGNOSIS — R27.0 ATAXIA: ICD-10-CM

## 2021-01-11 DIAGNOSIS — R53.1 WEAKNESS GENERALIZED: ICD-10-CM

## 2021-01-11 DIAGNOSIS — M54.16 LUMBAR BACK PAIN WITH RADICULOPATHY AFFECTING LEFT LOWER EXTREMITY: ICD-10-CM

## 2021-01-11 DIAGNOSIS — H46.9 OPTIC NEURITIS DUE TO MULTIPLE SCLEROSIS (HCC): ICD-10-CM

## 2021-01-11 DIAGNOSIS — G35 MULTIPLE SCLEROSIS (HCC): ICD-10-CM

## 2021-01-11 DIAGNOSIS — R41.3 MEMORY LOSS: ICD-10-CM

## 2021-01-11 DIAGNOSIS — M54.16 LUMBAR BACK PAIN WITH RADICULOPATHY AFFECTING RIGHT LOWER EXTREMITY: ICD-10-CM

## 2021-01-11 DIAGNOSIS — M47.22 CERVICAL RADICULOPATHY DUE TO DEGENERATIVE JOINT DISEASE OF SPINE: ICD-10-CM

## 2021-01-11 DIAGNOSIS — G82.50 QUADRIPARESIS (HCC): ICD-10-CM

## 2021-01-11 DIAGNOSIS — G35 OPTIC NEURITIS DUE TO MULTIPLE SCLEROSIS (HCC): ICD-10-CM

## 2021-01-11 RX ORDER — DULOXETIN HYDROCHLORIDE 30 MG/1
30 CAPSULE, DELAYED RELEASE ORAL DAILY
Qty: 30 CAP | Refills: 5 | Status: SHIPPED | OUTPATIENT
Start: 2021-01-11 | End: 2021-03-01 | Stop reason: CLARIF

## 2021-03-01 ENCOUNTER — VIRTUAL VISIT (OUTPATIENT)
Dept: NEUROLOGY | Age: 51
End: 2021-03-01
Payer: COMMERCIAL

## 2021-03-01 DIAGNOSIS — G56.02 CARPAL TUNNEL SYNDROME OF LEFT WRIST: ICD-10-CM

## 2021-03-01 DIAGNOSIS — M47.22 CERVICAL RADICULOPATHY DUE TO DEGENERATIVE JOINT DISEASE OF SPINE: ICD-10-CM

## 2021-03-01 DIAGNOSIS — M54.16 LUMBAR BACK PAIN WITH RADICULOPATHY AFFECTING RIGHT LOWER EXTREMITY: ICD-10-CM

## 2021-03-01 DIAGNOSIS — H46.9 OPTIC NEURITIS DUE TO MULTIPLE SCLEROSIS (HCC): ICD-10-CM

## 2021-03-01 DIAGNOSIS — R41.3 MEMORY LOSS: ICD-10-CM

## 2021-03-01 DIAGNOSIS — G35 OPTIC NEURITIS DUE TO MULTIPLE SCLEROSIS (HCC): ICD-10-CM

## 2021-03-01 DIAGNOSIS — R27.0 ATAXIA: ICD-10-CM

## 2021-03-01 DIAGNOSIS — M54.16 LUMBAR BACK PAIN WITH RADICULOPATHY AFFECTING LEFT LOWER EXTREMITY: ICD-10-CM

## 2021-03-01 DIAGNOSIS — G82.50 QUADRIPARESIS (HCC): ICD-10-CM

## 2021-03-01 DIAGNOSIS — G35 MULTIPLE SCLEROSIS (HCC): Primary | ICD-10-CM

## 2021-03-01 DIAGNOSIS — R53.1 WEAKNESS GENERALIZED: ICD-10-CM

## 2021-03-01 PROCEDURE — 99214 OFFICE O/P EST MOD 30 MIN: CPT | Performed by: PSYCHIATRY & NEUROLOGY

## 2021-03-01 RX ORDER — NORTRIPTYLINE HYDROCHLORIDE 25 MG/1
25 CAPSULE ORAL
Qty: 100 CAP | Refills: 4 | Status: SHIPPED | OUTPATIENT
Start: 2021-03-01 | End: 2021-06-01 | Stop reason: ALTCHOICE

## 2021-03-01 NOTE — LETTER
3/1/2021 9:55 PM 
 
Patient:  Kenisha Glass YOB: 1970 Date of Visit: 3/1/2021 Dear No Recipients: Thank you for referring Ms. Sandor Vasquez to me for evaluation/treatment. Below are the relevant portions of my assessment and plan of care. Consult Subjective: Jayashree Meredith is a 44 y. o.right-handed Afro-American female seen for evaluation at the request of Dr. Scottie Mann of new problem of having new problem of blurred vision in her eyes, and difficulty with weight gain, and wanted to know if there is something else she can take for her pain and to help her sleep at nighttime. She has had MS, but had MRI scans in July of the brain and cervical spine that showed no progression of disease. She has chronic degenerative arthritis and did better after physical therapy and continues to try to exercise. She is on Aubagio and tolerating that well without side effects and will continue that medication. He is having increasing difficulty with back pain, that radiates into her legs, and did not get better with physical therapy, and because of this we will get an MRI scan to rule out lumbar disc with the pain radiating into her left leg, the severe, and she is almost fallen several times because of the severe back pain. She can take gabapentin so we will try her on nortriptyline. Patient has marked increased in pain in her legs, particular when she is standing up and bearing weight for period of time, with the pain being an aching sensation from the knees down, occasionally into the left hip and radiating from the back region, left side greater than right, and occasionally even having some intermittent radiating into her left arm. It all sounds like degenerative arthritis and maybe some mild radiculopathy but she is very upset about it, and since she never has pain when she is laying flat, and occasionally when she is sitting the pain will occur, but is mainly when she is standing and I told her that probably not her MS, because it would be more persistent. She just had an MRI scan done July 2020 that showed stable disease since 2014 of the brain, and improved disease in the cervical spine consistent with her MS, but no new lesions, but did show some mild arthritis.   Patient is currently on Aubagio for her MS. She tolerated it well without clear side effects. Patient is still trying to work 2 jobs. Patient does have MS relapsing remitting and continues to suffer with marked difficulty with fatigue, spastic paraplegia, cognitive impairment, visual disturbance, physical and mental fatigue, and having difficulty doing her work because she cannot keep up, and is always constantly exhausted. Has had no other side effect on medication, and is tolerating it well. She is not had a recent MRI scan in 5 years, and feels that she is getting worse, so she can do her job anymore, and her job requires she stands all day and she is having difficulty doing that getting more arthritis and joint pain. We will also check her metabolic parameters and connective tissue parameters looking for other cause for her clinical symptoms. 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 She's had MS over 25 years. She has bilateral weakness, cognitive issues in coordination issues and gait tissues and fatigue. The patient is clearly disabled. She has not had any acute exacerbations in the last months. She has had no new focal weakness sensory loss vision problems or cognitive issues that are new. She has to ambulate with a cane because of her gait instability. She is very limited in her exertional activity due to the fatigue and weakness. She's had no other side effects on the medication. Patient also has decreased coordination in her hands, difficulty focusing her eyes and is mildly cognitively slow She's had no other new medical problem complication or illnesses Past Medical History:  
Diagnosis Date  Alopecia  Essential hypertension  Multiple sclerosis ([facility] [address]  Neurological disorder History reviewed. No pertinent surgical history. Family History Problem Relation Age of Onset  Cancer Mother   
     kidney  Heart Disease Mother  Kidney Disease Mother  Diabetes Mother  Hypertension Mother  Other Father GSW  Heart Disease Brother  Hypertension Brother Social History Tobacco Use  Smoking status: Never Smoker  Smokeless tobacco: Never Used Substance Use Topics  Alcohol use: No  
   
Current Outpatient Medications Medication Sig Dispense Refill  nortriptyline (PAMELOR) 25 mg capsule Take 1 Cap by mouth nightly. 100 Cap 4  teriflunomide (Aubagio) 14 mg tablet TAKE 1 TABLET DAILY WITH DINNER 90 Tab 5  
 lisinopril-hydroCHLOROthiazide (PRINZIDE, ZESTORETIC) 10-12.5 mg per tablet TAKE 1 TABLET BY MOUTH EVERY DAY  0  
 Cholecalciferol, Vitamin D3, (VITAMIN D3) 1,000 unit cap Take 2,000 Units by mouth. Allergies Allergen Reactions  Doxycycline Hives  Bystolic [Nebivolol] Other (comments) Chest pain  Losartan Itching  Lotrel [Amlodipine-Benazepril] Swelling Review of Systems: A comprehensive review of systems was negative except for: Musculoskeletal: positive for myalgias, arthralgias and stiff joints Neurological: positive for memory problems, paresthesia, coordination problems, gait problems and weakness Objective: I 
 
 
NEUROLOGICAL EXAM: 
 
Appearance: The patient is well developed, well nourished, provides a fair history and is in no acute distress. Patient has multiple skin lesions, including nodules and papules and darkened rashes from previous lesions on her legs and arms and hands Mental Status: Oriented to time, place and person and the president, but patient has difficulty doing serial sevens and remembering 3 of 3 words at 30 seconds, and doing math, and drawing a clock showing the time 10:50, and does have some mild cognitive impairment. . Mood and affect appropriate, but depressed. Cranial Nerves:   Intact visual fields. Fundi are benign, with mild bilateral optic pallor seen, at last visit but not testable this visit HILDA, EOM's full, no nystagmus, no ptosis. Facial sensation is normal. Corneal reflexes are not tested. Facial movement is symmetric. Hearing is normal bilaterally. Palate is midline with normal sternocleidomastoid and trapezius muscles are normal. Tongue is midline. Neck is supple without meningismus or bruits Temporal arteries are not tender or enlarged Motor:  4/5 strength in upper and lower proximal and distal muscles, left Side seems weaker than the right . Normal bulk and increased tone. No fasciculations. Patient has moderate spastic weakness in all extremities with decreased coordination. Rapid alternating movement is slow in all extremities. Reflexes:   Deep tendon reflexes 3+/4 and symmetrical and no clear Babinski or clonus present but not testable this visit Sensory:   Normal to touch, pinprick and vibration mildly decreased in both lower extremities and double simultaneous stimulation is intact at last visit but not testable this visit Gait:  Abnormal gait due to bilateral spastic weakness L>R, patient can walk without assistive devices, but is slow due to her spastic paraparetic gait, and walks with a marked limp. Tremor:   No tremor noted. Cerebellar:  Abnormal Romberg and tandem cerebellar signs present, and finger-nose-finger examination is unremarkable. Neurovascular:  Normal heart sounds and regular rhythm, peripheral pulses decreased, and no carotid bruits at last visit but not testable this visit. Assessment:  
 
Plan:  
 
Patient with new problem of worsening leg pain and left arm pain that sounds like it might be some radicular component so we will check an MRI scan of the back because she failed to respond to therapy and conservative management, and her pain is so severe. Previous EMG study did not show any clear radicular component in her legs, just a borderline carpal tunnel syndrome in her arm. Sulma Mathew We gave her nortriptyline to use for her pain because she cannot take gabapentin. Patient with slowly worsening MS, and can no longer do her job well, and clearly is disabled, we advised her to apply for disability because of her cognitive difficulties, mental impairment, increasing visual symptoms secondary to her optic neuropathies, and marked mental and physical fatigue and quadriplegia, but she is still trying to work 2 jobs. Sulma Mathew Relapsing remitting multiple sclerosis with moderate to severe disability, but no acute exacerbation recently but patient had recent cervical MRI scan July 2020 that showed stable disease in the cervical spine, and mild arthritis, and she has had an MRI of the brain July 2020 that also showed stable disease since 2014. We will get metabolic parameters to check the safety of her Aubagio but she is tolerating it well without side effects Patient will continue [place] We will continue to follow, and blood work was drawn today She is to continue to work with her dermatologist and use Vistaril to use p.r.n. for itching She is to continue her current medications and vitamins and vitamin D and remain mentally and physically active as possible. Followup visit in 3 months time. 35-minute spent with the patient, going over her history, going over her exam, going over her course of therapy, and discussing diagnostic testing and therapy in the future, and reviewing all her previous records, EMG studies MRI scans and lab works Metabolic parameters will check today Signed By: Juanito Sharma MD   
 March 1, 2021 Dre Parker was seen by synchronous (real-time) audio-video technology on 03/01/21. Consent: She and/or her healthcare decision maker is aware that this patient-initiated Telehealth encounter is a billable service, with coverage as determined by her insurance carrier. She is aware that she may receive a bill and has provided verbal consent to proceed: Yes I was in the office while conducting this encounter. Pursuant to the emergency declaration under the [facility] waiver authority and the [employer] Act, this Virtual  Visit was conducted, with patient's consent, to reduce the patient's risk of exposure to COVID-19 and provide continuity of care for an established patient. Services were provided through a video synchronous discussion virtually to substitute for in-person clinic visit. If you have questions, please do not hesitate to call me. I look forward to following Ms. Anthony Dixon along with you. Sincerely, Patricia Granados MD

## 2021-03-02 NOTE — PROGRESS NOTES
Consult    Subjective:     Valentina Quesada is a 44 y. o.right-handed Afro-American female seen for evaluation at the request of Dr. Della Cesar of new problem of having new problem of blurred vision in her eyes, and difficulty with weight gain, and wanted to know if there is something else she can take for her pain and to help her sleep at nighttime. She has had MS, but had MRI scans in July of the brain and cervical spine that showed no progression of disease. She has chronic degenerative arthritis and did better after physical therapy and continues to try to exercise. She is on Aubagio and tolerating that well without side effects and will continue that medication. He is having increasing difficulty with back pain, that radiates into her legs, and did not get better with physical therapy, and because of this we will get an MRI scan to rule out lumbar disc with the pain radiating into her left leg, the severe, and she is almost fallen several times because of the severe back pain. She can take gabapentin so we will try her on nortriptyline. Patient has marked increased in pain in her legs, particular when she is standing up and bearing weight for period of time, with the pain being an aching sensation from the knees down, occasionally into the left hip and radiating from the back region, left side greater than right, and occasionally even having some intermittent radiating into her left arm. It all sounds like degenerative arthritis and maybe some mild radiculopathy but she is very upset about it, and since she never has pain when she is laying flat, and occasionally when she is sitting the pain will occur, but is mainly when she is standing and I told her that probably not her MS, because it would be more persistent.   She just had an MRI scan done July 2020 that showed stable disease since 2014 of the brain, and improved disease in the cervical spine consistent with her MS, but no new lesions, but did show some mild arthritis. Patient is currently on Aubagio for her MS. She tolerated it well without clear side effects. Patient is still trying to work 2 jobs. Patient does have MS relapsing remitting and continues to suffer with marked difficulty with fatigue, spastic paraplegia, cognitive impairment, visual disturbance, physical and mental fatigue, and having difficulty doing her work because she cannot keep up, and is always constantly exhausted. Has had no other side effect on medication, and is tolerating it well. She is not had a recent MRI scan in 5 years, and feels that she is getting worse, so she can do her job anymore, and her job requires she stands all day and she is having difficulty doing that getting more arthritis and joint pain. We will also check her metabolic parameters and connective tissue parameters looking for other cause for her clinical symptoms. 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 She's had MS over 25 years. She has bilateral weakness, cognitive issues in coordination issues and gait tissues and fatigue. The patient is clearly disabled. She has not had any acute exacerbations in the last months. She has had no new focal weakness sensory loss vision problems or cognitive issues that are new. She has to ambulate with a cane because of her gait instability. She is very limited in her exertional activity due to the fatigue and weakness. She's had no other side effects on the medication. Patient also has decreased coordination in her hands, difficulty focusing her eyes and is mildly cognitively slow       She's had no other new medical problem complication or illnesses    Past Medical History:   Diagnosis Date    Alopecia     Essential hypertension     [facility] sclerosis ([facility] [address]     Neurological disorder       History reviewed.  No pertinent surgical history. Family History   Problem Relation Age of Onset   Leonila Tadeo Cancer Mother         kidney    Heart Disease Mother     Kidney Disease Mother     Diabetes Mother     Hypertension Mother     Other Father         GSW    Heart Disease Brother     Hypertension Brother       Social History     Tobacco Use    Smoking status: Never Smoker    Smokeless tobacco: Never Used   Substance Use Topics    Alcohol use: No       Current Outpatient Medications   Medication Sig Dispense Refill    nortriptyline (PAMELOR) 25 mg capsule Take 1 Cap by mouth nightly. 100 Cap 4    teriflunomide (Aubagio) 14 mg tablet TAKE 1 TABLET DAILY WITH DINNER 90 Tab 5    lisinopril-hydroCHLOROthiazide (PRINZIDE, ZESTORETIC) 10-12.5 mg per tablet TAKE 1 TABLET BY MOUTH EVERY DAY  0    Cholecalciferol, Vitamin D3, (VITAMIN D3) 1,000 unit cap Take 2,000 Units by mouth. Allergies   Allergen Reactions    Doxycycline Hives    Bystolic [Nebivolol] Other (comments)     Chest pain    Losartan Itching    Lotrel [Amlodipine-Benazepril] Swelling        Review of Systems:  A comprehensive review of systems was negative except for: Musculoskeletal: positive for myalgias, arthralgias and stiff joints  Neurological: positive for memory problems, paresthesia, coordination problems, gait problems and weakness     Objective:     I      NEUROLOGICAL EXAM:    Appearance: The patient is well developed, well nourished, provides a fair history and is in no acute distress. Patient has multiple skin lesions, including nodules and papules and darkened rashes from previous lesions on her legs and arms and hands   Mental Status: Oriented to time, place and person and the president, but patient has difficulty doing serial sevens and remembering 3 of 3 words at 30 seconds, and doing math, and drawing a clock showing the time 10:50, and does have some mild cognitive impairment. . Mood and affect appropriate, but depressed.    Cranial Nerves:   Intact visual fields. Fundi are benign, with mild bilateral optic pallor seen, at last visit but not testable this visit HILDA, EOM's full, no nystagmus, no ptosis. Facial sensation is normal. Corneal reflexes are not tested. Facial movement is symmetric. Hearing is normal bilaterally. Palate is midline with normal sternocleidomastoid and trapezius muscles are normal. Tongue is midline. Neck is supple without meningismus or bruits  Temporal arteries are not tender or enlarged   Motor:  4/5 strength in upper and lower proximal and distal muscles, left Side seems weaker than the right . Normal bulk and increased tone. No fasciculations. Patient has moderate spastic weakness in all extremities with decreased coordination. Rapid alternating movement is slow in all extremities. Reflexes:   Deep tendon reflexes 3+/4 and symmetrical and no clear Babinski or clonus present but not testable this visit   Sensory:   Normal to touch, pinprick and vibration mildly decreased in both lower extremities and double simultaneous stimulation is intact at last visit but not testable this visit   Gait:  Abnormal gait due to bilateral spastic weakness L>R, patient can walk without assistive devices, but is slow due to her spastic paraparetic gait, and walks with a marked limp. Tremor:   No tremor noted. Cerebellar:  Abnormal Romberg and tandem cerebellar signs present, and finger-nose-finger examination is unremarkable. Neurovascular:  Normal heart sounds and regular rhythm, peripheral pulses decreased, and no carotid bruits at last visit but not testable this visit. Assessment:     Plan:     Patient with new problem of worsening leg pain and left arm pain that sounds like it might be some radicular component so we will check an MRI scan of the back because she failed to respond to therapy and conservative management, and her pain is so severe.   Previous EMG study did not show any clear radicular component in her legs, just a borderline carpal tunnel syndrome in her arm. .    We gave her nortriptyline to use for her pain because she cannot take gabapentin. Patient with slowly worsening MS, and can no longer do her job well, and clearly is disabled, we advised her to apply for disability because of her cognitive difficulties, mental impairment, increasing visual symptoms secondary to her optic neuropathies, and marked mental and physical fatigue and quadriplegia, but she is still trying to work 2 jobs. .  Relapsing remitting multiple sclerosis with moderate to severe disability, but no acute exacerbation recently but patient had recent cervical MRI scan July 2020 that showed stable disease in the cervical spine, and mild arthritis, and she has had an MRI of the brain July 2020 that also showed stable disease since 2014. We will get metabolic parameters to check the safety of her Aubagio but she is tolerating it well without side effects  Patient will continue aubagio   We will continue to follow, and blood work was drawn today   She is to continue to work with her dermatologist and use Vistaril to use p.r.n. for itching  She is to continue her current medications and vitamins and vitamin D and remain mentally and physically active as possible. Followup visit in 3 months time. 35-minute spent with the patient, going over her history, going over her exam, going over her course of therapy, and discussing diagnostic testing and therapy in the future, and reviewing all her previous records, EMG studies MRI scans and lab works  Metabolic parameters will check today    Signed By: Cammy Ma MD     March 1, 2021       Flaco Zepeda was seen by synchronous (real-time) audio-video technology on 03/01/21. Consent:  She and/or her healthcare decision maker is aware that this patient-initiated Telehealth encounter is a billable service, with coverage as determined by her insurance carrier.  She is aware that she may receive a bill and has provided verbal consent to proceed: Yes    I was in the office while conducting this encounter.    Pursuant to the emergency declaration under the [employer] Act and the National Emergencies Act, 1135 waiver authority and the Coronavirus Preparedness and Response Supplemental Appropriations Act, this Virtual  Visit was conducted, with patient's consent, to reduce the patient's risk of exposure to COVID-19 and provide continuity of care for an established patient.     Services were provided through a video synchronous discussion virtually to substitute for in-person clinic visit.

## 2021-03-12 ENCOUNTER — HOSPITAL ENCOUNTER (OUTPATIENT)
Dept: MRI IMAGING | Age: 51
Discharge: HOME | End: 2021-03-12
Attending: PSYCHIATRY & NEUROLOGY
Payer: COMMERCIAL

## 2021-03-12 DIAGNOSIS — M54.16 LUMBAR BACK PAIN WITH RADICULOPATHY AFFECTING LEFT LOWER EXTREMITY: ICD-10-CM

## 2021-03-12 DIAGNOSIS — R53.1 WEAKNESS GENERALIZED: ICD-10-CM

## 2021-03-12 DIAGNOSIS — G82.50 QUADRIPARESIS (HCC): ICD-10-CM

## 2021-03-12 DIAGNOSIS — H46.9 OPTIC NEURITIS DUE TO MULTIPLE SCLEROSIS (HCC): ICD-10-CM

## 2021-03-12 DIAGNOSIS — G35 OPTIC NEURITIS DUE TO MULTIPLE SCLEROSIS (HCC): ICD-10-CM

## 2021-03-12 DIAGNOSIS — R27.0 ATAXIA: ICD-10-CM

## 2021-03-12 DIAGNOSIS — M47.22 CERVICAL RADICULOPATHY DUE TO DEGENERATIVE JOINT DISEASE OF SPINE: ICD-10-CM

## 2021-03-12 DIAGNOSIS — M54.16 LUMBAR BACK PAIN WITH RADICULOPATHY AFFECTING RIGHT LOWER EXTREMITY: ICD-10-CM

## 2021-03-12 DIAGNOSIS — R41.3 MEMORY LOSS: ICD-10-CM

## 2021-03-12 DIAGNOSIS — G35 MULTIPLE SCLEROSIS (HCC): ICD-10-CM

## 2021-03-12 DIAGNOSIS — G56.02 CARPAL TUNNEL SYNDROME OF LEFT WRIST: ICD-10-CM

## 2021-03-12 PROCEDURE — 72148 MRI LUMBAR SPINE W/O DYE: CPT

## 2021-03-16 ENCOUNTER — DOCUMENTATION ONLY (OUTPATIENT)
Dept: NEUROLOGY | Age: 51
End: 2021-03-16

## 2021-03-16 NOTE — PROGRESS NOTES
I called the patient, told her MRI scan showed mild degenerative arthritis but nothing surgical, she is already better on the medication we gave her, he does not want pain management referral, we will continue treatment as above.

## 2021-05-10 ENCOUNTER — TELEPHONE (OUTPATIENT)
Dept: NEUROLOGY | Age: 51
End: 2021-05-10

## 2021-05-10 NOTE — TELEPHONE ENCOUNTER
Aubagio renewal     New plan [id number] [place] [id number]  [address]    Sent to [address] [facility] via [facility]. Approved -    Approvedtoday  [place] Case: [id number], Status: Approved, Coverage Starts on: 5/10/2021 12:00:00 AM, Coverage Ends on: 5/10/2022 12:00:00 AM.    [facility] needs an actual paper Rx faxed to [employer] @ [phone]    Also fax copy to:  [phone]. fyi to [place].

## 2021-05-11 ENCOUNTER — TELEPHONE (OUTPATIENT)
Dept: NEUROLOGY | Age: 51
End: 2021-05-11

## 2021-05-14 DIAGNOSIS — M47.22 CERVICAL RADICULOPATHY DUE TO DEGENERATIVE JOINT DISEASE OF SPINE: ICD-10-CM

## 2021-05-14 DIAGNOSIS — H46.9 OPTIC NEURITIS DUE TO MULTIPLE SCLEROSIS (HCC): ICD-10-CM

## 2021-05-14 DIAGNOSIS — G35 MULTIPLE SCLEROSIS (HCC): ICD-10-CM

## 2021-05-14 DIAGNOSIS — G35 OPTIC NEURITIS DUE TO MULTIPLE SCLEROSIS (HCC): ICD-10-CM

## 2021-05-14 DIAGNOSIS — R53.1 WEAKNESS GENERALIZED: ICD-10-CM

## 2021-05-14 DIAGNOSIS — G82.50 QUADRIPARESIS (HCC): ICD-10-CM

## 2021-05-14 DIAGNOSIS — R41.3 MEMORY LOSS: ICD-10-CM

## 2021-05-14 DIAGNOSIS — M54.16 LUMBAR BACK PAIN WITH RADICULOPATHY AFFECTING LEFT LOWER EXTREMITY: ICD-10-CM

## 2021-05-14 DIAGNOSIS — M54.16 LUMBAR BACK PAIN WITH RADICULOPATHY AFFECTING RIGHT LOWER EXTREMITY: ICD-10-CM

## 2021-05-14 DIAGNOSIS — R27.0 ATAXIA: ICD-10-CM

## 2021-05-20 ENCOUNTER — TELEPHONE (OUTPATIENT)
Dept: NEUROLOGY | Age: 51
End: 2021-05-20

## 2021-05-20 NOTE — TELEPHONE ENCOUNTER
Dr. Rashmi Amaya is approved to march 2022 but [facility] wants to know if pt has had a TB test?     Is this done annually or just prior to starting Aubagio?

## 2021-05-20 NOTE — TELEPHONE ENCOUNTER
----- Message from Thais Spurling sent at 5/20/2021 10:42 AM EDT -----  Regarding: Dr Vanessa Gonsalves from [address] is calling to get a TB Test Results, so they can sent out medication to the pt, please call pt at [phone].

## 2021-05-21 NOTE — TELEPHONE ENCOUNTER
I called the pharmacy to let them know the TB test was not positive from 2016. They will send out the medication to the patient. The pharmacist is stating that the TB test needs to be repeated every 2 years for the medication. Please see above and let me know    Other note: the patient is on Cymbalta which has a slight interaction with Jef and the patient refused counseling on this. Decreases the efficacy of the Cymbalta. Is there a problem with this that needs to be addressed as well?

## 2021-05-21 NOTE — TELEPHONE ENCOUNTER
email from [address] [facility] at [facility] - this is in their [facility] lingo:    Patient on service with Lexie I will forward to them . Also note in the system on this patient not sure if your office was contacted yet from [place] [address]. PT REFUSED COUNSELING  AND LEFT THE LINE. PLEASE CONTACT [facility] REGARDING TB TEST DATE & RESULTS AND ALSO NOTIFY             THEM OF DDI B/W AUBAGIO & DULOXETINE (PER FACTS). PT REFUSED TO SPEAK TO       [facility] REGARDING WHAT TO DO IF SHE GETS SICK OR GETS AN INFECTION >> NOTIFY [facility].  Jojo Leonard PHONE # [phone]     fyi to Dr. Jayne Mathew and Daniel Pate.

## 2021-05-22 NOTE — TELEPHONE ENCOUNTER
Coni, I am not quite sure whether hospitalist to do something with this note or not, can you let me know on Monday

## 2021-05-29 ENCOUNTER — TELEPHONE (OUTPATIENT)
Dept: NEUROLOGY | Age: 51
End: 2021-05-29

## 2021-05-29 NOTE — TELEPHONE ENCOUNTER
I scanned Maulik Render letter from [facility]   Date range of approval 5/10/21 - 5/10/22. Faxed to [facility] and [facility].     fyi to Dr. Chung Lipscomb. (pt has appt 6/1/21).

## 2021-06-01 ENCOUNTER — OFFICE VISIT (OUTPATIENT)
Dept: NEUROLOGY | Age: 51
End: 2021-06-01
Payer: COMMERCIAL

## 2021-06-01 VITALS
HEART RATE: 89 BPM | BODY MASS INDEX: 31.86 KG/M2 | WEIGHT: 203 LBS | SYSTOLIC BLOOD PRESSURE: 129 MMHG | OXYGEN SATURATION: 96 % | RESPIRATION RATE: 16 BRPM | HEIGHT: 67 IN | DIASTOLIC BLOOD PRESSURE: 88 MMHG

## 2021-06-01 DIAGNOSIS — H46.9 OPTIC NEURITIS DUE TO MULTIPLE SCLEROSIS (HCC): ICD-10-CM

## 2021-06-01 DIAGNOSIS — G56.02 CARPAL TUNNEL SYNDROME OF LEFT WRIST: Primary | ICD-10-CM

## 2021-06-01 DIAGNOSIS — R41.3 MEMORY LOSS: ICD-10-CM

## 2021-06-01 DIAGNOSIS — G35 OPTIC NEURITIS DUE TO MULTIPLE SCLEROSIS (HCC): ICD-10-CM

## 2021-06-01 DIAGNOSIS — G82.50 QUADRIPARESIS (HCC): ICD-10-CM

## 2021-06-01 DIAGNOSIS — M47.22 CERVICAL RADICULOPATHY DUE TO DEGENERATIVE JOINT DISEASE OF SPINE: ICD-10-CM

## 2021-06-01 DIAGNOSIS — G35 MULTIPLE SCLEROSIS (HCC): ICD-10-CM

## 2021-06-01 DIAGNOSIS — M54.16 LUMBAR BACK PAIN WITH RADICULOPATHY AFFECTING RIGHT LOWER EXTREMITY: ICD-10-CM

## 2021-06-01 DIAGNOSIS — M54.16 LUMBAR BACK PAIN WITH RADICULOPATHY AFFECTING LEFT LOWER EXTREMITY: ICD-10-CM

## 2021-06-01 PROCEDURE — 99214 OFFICE O/P EST MOD 30 MIN: CPT | Performed by: PSYCHIATRY & NEUROLOGY

## 2021-06-01 RX ORDER — ACETAMINOPHEN 500 MG
TABLET ORAL DAILY
COMMUNITY

## 2021-06-01 RX ORDER — MINERAL OIL
180 ENEMA (ML) RECTAL DAILY
COMMUNITY

## 2021-06-01 RX ORDER — AMLODIPINE AND BENAZEPRIL HYDROCHLORIDE 5; 20 MG/1; MG/1
1 CAPSULE ORAL DAILY
COMMUNITY

## 2021-06-01 RX ORDER — FUROSEMIDE 20 MG/1
TABLET ORAL
COMMUNITY
Start: 2021-04-21

## 2021-06-01 RX ORDER — FAMOTIDINE 40 MG/1
TABLET, FILM COATED ORAL
COMMUNITY
Start: 2021-01-14

## 2021-06-01 RX ORDER — DULOXETIN HYDROCHLORIDE 30 MG/1
CAPSULE, DELAYED RELEASE ORAL
COMMUNITY
Start: 2021-05-04 | End: 2021-06-11 | Stop reason: SDUPTHER

## 2021-06-01 NOTE — LETTER
6/1/2021 8:57 PM 
 
Patient:  Yisel Song YOB: 1970 Date of Visit: 6/1/2021 Dear No Recipients: Thank you for referring Ms. Marylu Rizvi to me for evaluation/treatment. Below are the relevant portions of my assessment and plan of care. Consult Subjective:  
 
Yisel Song is a 46 y. o.right-handed Afro-American female seen for evaluation at the request of Dr. Indu Aguila of new problem of having marked increase in nausea and GI side effects, for the last several months, and thinking this is related to her blood pressure medication which has been regulated and adjusted several times because her blood pressure tends to run high and she is having a GI side effects. But on listening to her history, this has been going on since around March, when we started her on the Cymbalta 30 mg a day which is helping a lot for her musculoskeletal pain and I wonder if just not the cause. Patient was very reluctant to try to stop the medication because is helping her pain, we have reassured her we could give her something else later if she needed it, so she finally agrees to stop it for 2 days and call us to let us know how she is doing. Her blood pressure pills look like to call that much nausea. Aubagio can cause nausea initially, but she has been taking it for a while and had no side effect previously. Marked increased in pain in her legs, particular when she is standing up and bearing weight for period of time, with the pain being an aching sensation from the knees down, occasionally into the left hip and radiating from the back region, left side greater than right, and occasionally even having some intermittent radiating into her left arm.   It all sounds like degenerative arthritis and maybe some mild radiculopathy but she is very upset about it, and since she never has pain when she is laying flat, and occasionally when she is sitting the pain will occur, but is mainly when she is standing and I told her that probably not her MS, because it would be more persistent. Patient had an MRI scan of her lumbar spine in March 2021 that showed moderate degenerative changes, but no surgical disease, she was given physical therapy and got better but she continues to have discomfort we encouraged her to make sure she keeps doing her exercises. Her EMG study just showed mild carpal tunnel syndrome on the left but no other significant motor radiculopathy in the legs. She just had an MRI scan of the brain and cervical spine done July 2020 that showed stable disease since 2014 of the brain, and improved disease in the cervical spine consistent with her MS, but no new lesions, but did show some mild arthritis. Patient is currently on Aubagio for her MS. She tolerated it well without clear side effects. Patient is still trying to work 2 jobs. Patient does have MS relapsing remitting and continues to suffer with marked difficulty with fatigue, spastic paraplegia, cognitive impairment, visual disturbance, physical and mental fatigue, and having difficulty doing her work because she cannot keep up, and is always constantly exhausted. Has had no other side effect on medication, and is tolerating it well. She is not had a recent MRI scan in 5 years, and feels that she is getting worse, so she can do her job anymore, and her job requires she stands all day and she is having difficulty doing that getting more arthritis and joint pain. We will also check her metabolic parameters and connective tissue parameters looking for other cause for her clinical symptoms. 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 She's had MS over 25 years. She has bilateral weakness, cognitive issues in coordination issues and gait tissues and fatigue.  The patient is clearly disabled. She has not had any acute exacerbations in the last months. She has had no new focal weakness sensory loss vision problems or cognitive issues that are new. She has to ambulate with a cane because of her gait instability. She is very limited in her exertional activity due to the fatigue and weakness. She's had no other side effects on the medication. Patient also has decreased coordination in her hands, difficulty focusing her eyes and is mildly cognitively slow She's had no other new medical problem complication or illnesses Past Medical History:  
Diagnosis Date  Alopecia  Essential hypertension  Multiple sclerosis ([address]  Neurological disorder History reviewed. No pertinent surgical history. Family History Problem Relation Age of Onset  Cancer Mother   
     kidney  Heart Disease Mother  Kidney Disease Mother  Diabetes Mother  Hypertension Mother  Other Father GSW  Heart Disease Brother  Hypertension Brother Social History Tobacco Use  Smoking status: Never Smoker  Smokeless tobacco: Never Used Substance Use Topics  Alcohol use: No  
   
Current Outpatient Medications Medication Sig Dispense Refill  furosemide (LASIX) 20 mg tablet 20 mg = 1 tab each dose, PO, daily, # 90 tab, 3 Refills, Pharmacy: [facility]/pharmacy #[id number]  DULoxetine (CYMBALTA) 30 mg capsule TAKE 1 CAPSULE BY MOUTH EVERY DAY  famotidine (PEPCID) 40 mg tablet 40 mg = 1 tab each dose, PO, bedtime, # 90 tab, 3 Refills, Pharmacy: [facility]/pharmacy #[id number]   
 amLODIPine-benazepril (LOTREL) 5-20 mg per capsule Take 1 Capsule by mouth daily.  fexofenadine (ALLEGRA) 180 mg tablet Take 180 mg by mouth daily.  cholecalciferol (VITAMIN D3) (2,000 UNITS /50 MCG) cap capsule Take  by mouth daily.  teriflunomide (Aubagio) 14 mg tablet TAKE 1 TABLET DAILY WITH DINNER 90 Tab 5 Allergies Allergen Reactions  Doxycycline Hives  Bystolic [Nebivolol] Other (comments) Chest pain  Losartan Itching  Lotrel [Amlodipine-Benazepril] Swelling Review of Systems: A comprehensive review of systems was negative except for: Musculoskeletal: positive for myalgias, arthralgias and stiff joints Neurological: positive for memory problems, paresthesia, coordination problems, gait problems and weakness Objective: I 
 
 
NEUROLOGICAL EXAM: 
 
Appearance: The patient is well developed, well nourished, provides a fair history and is in no acute distress. Patient has multiple skin lesions, including nodules and papules and darkened rashes from previous lesions on her legs and arms and hands Mental Status: Oriented to time, place and person and the president, but patient has difficulty doing serial sevens and remembering 3 of 3 words at 30 seconds, and doing math, and drawing a clock showing the time 10:50, and does have some mild cognitive impairment. . Mood and affect appropriate, but depressed. Cranial Nerves:   Intact visual fields. Fundi are benign, with mild bilateral optic pallor seen. HILDA, EOM's full, no nystagmus, no ptosis. Facial sensation is normal. Corneal reflexes are not tested. Facial movement is symmetric. Hearing is normal bilaterally. Palate is midline with normal sternocleidomastoid and trapezius muscles are normal. Tongue is midline. Neck is supple without meningismus or bruits Temporal arteries are not tender or enlarged Motor:  4/5 strength in upper and lower proximal and distal muscles, left Side seems weaker than the right . Normal bulk and increased tone. No fasciculations. Patient has moderate spastic weakness in all extremities with decreased coordination. Rapid alternating movement is slow in all extremities. Reflexes:   Deep tendon reflexes 3+/4 and symmetrical. No clear Babinski or clonus present Sensory:   Normal to touch, pinprick and vibration mildly decreased in both lower extremities. Double simultaneous stimulation is intact Gait:  Abnormal gait due to bilateral spastic weakness L>R, patient can walk without assistive devices, but is slow due to her spastic paraparetic gait, and walks with a marked limp. Tremor:   No tremor noted. Cerebellar:  Abnormal Romberg and tandem cerebellar signs present, and finger-nose-finger examination is unremarkable. Neurovascular:  Normal heart sounds and regular rhythm, peripheral pulses decreased, and no carotid bruits. Assessment:  
 
Plan:  
 
Patient with new problem of increasing nausea and GI upset and diarrhea, and family agrees to hold on the Cymbalta till we see if that is the cause of her symptoms. She will call back in 3 days we can put her on something else like gabapentin or nortriptyline again, we will see how she does first. 
Patient with problem of worsening leg pain and left arm pain that sounds like it might be some radicular component, particular in her leg, but the MRI scan did not show surgical disease, EMG study did not show clear radiculopathy, just mild borderline left carpal tunnel syndrome, and she got better with some physical therapy so we will continue that for now and hold on intervention. Patient with slowly worsening MS, and can no longer do her job well, and clearly is disabled, we advised her to apply for disability because of her cognitive difficulties, mental impairment, increasing visual symptoms secondary to her optic neuropathies, and marked mental and physical fatigue and quadriplegia, but she is still trying to work 2 jobs. Katherine Mckeon Relapsing remitting multiple sclerosis with moderate to severe disability, but no acute exacerbation recently but patient had recent cervical MRI scan July 2020 that showed stable disease in the cervical spine, and mild arthritis, and she has had an MRI of the brain July 2020 that also showed stable disease since 2014.  
We will get metabolic parameters to check the safety of her Aubagio but she is tolerating it well without side effects Patient will continue [place] We will continue to follow, and blood work was drawn today She is to continue to work with her dermatologist and use Vistaril to use p.r.n. for itching She is to continue her current medications and vitamins and vitamin D and remain mentally and physically active as possible. Followup visit in 6 months time. Metabolic parameters will check today Signed By: Ashby Kehr, MD   
 June 1, 2021 If you have questions, please do not hesitate to call me. I look forward to following Ms. Kasia Crow along with you. Sincerely, Ashby Kehr, MD

## 2021-06-02 NOTE — PROGRESS NOTES
Consult    Subjective:     Brandon Pearson is a 46 y. o.right-handed Afro-American female seen for evaluation at the request of Dr. Mccarty  of new problem of having marked increase in nausea and GI side effects, for the last several months, and thinking this is related to her blood pressure medication which has been regulated and adjusted several times because her blood pressure tends to run high and she is having a GI side effects. But on listening to her history, this has been going on since around March, when we started her on the Cymbalta 30 mg a day which is helping a lot for her musculoskeletal pain and I wonder if just not the cause. Patient was very reluctant to try to stop the medication because is helping her pain, we have reassured her we could give her something else later if she needed it, so she finally agrees to stop it for 2 days and call us to let us know how she is doing. Her blood pressure pills look like to call that much nausea. Aubagio can cause nausea initially, but she has been taking it for a while and had no side effect previously. Marked increased in pain in her legs, particular when she is standing up and bearing weight for period of time, with the pain being an aching sensation from the knees down, occasionally into the left hip and radiating from the back region, left side greater than right, and occasionally even having some intermittent radiating into her left arm. It all sounds like degenerative arthritis and maybe some mild radiculopathy but she is very upset about it, and since she never has pain when she is laying flat, and occasionally when she is sitting the pain will occur, but is mainly when she is standing and I told her that probably not her MS, because it would be more persistent.   Patient had an MRI scan of her lumbar spine in March 2021 that showed moderate degenerative changes, but no surgical disease, she was given physical therapy and got better but she continues to have discomfort we encouraged her to make sure she keeps doing her exercises. Her EMG study just showed mild carpal tunnel syndrome on the left but no other significant motor radiculopathy in the legs. She just had an MRI scan of the brain and cervical spine done July 2020 that showed stable disease since 2014 of the brain, and improved disease in the cervical spine consistent with her MS, but no new lesions, but did show some mild arthritis. Patient is currently on Aubagio for her MS. She tolerated it well without clear side effects. Patient is still trying to work 2 jobs. Patient does have MS relapsing remitting and continues to suffer with marked difficulty with fatigue, spastic paraplegia, cognitive impairment, visual disturbance, physical and mental fatigue, and having difficulty doing her work because she cannot keep up, and is always constantly exhausted. Has had no other side effect on medication, and is tolerating it well. She is not had a recent MRI scan in 5 years, and feels that she is getting worse, so she can do her job anymore, and her job requires she stands all day and she is having difficulty doing that getting more arthritis and joint pain. We will also check her metabolic parameters and connective tissue parameters looking for other cause for her clinical symptoms. 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 She's had MS over 25 years. She has bilateral weakness, cognitive issues in coordination issues and gait tissues and fatigue. The patient is clearly disabled. She has not had any acute exacerbations in the last months. She has had no new focal weakness sensory loss vision problems or cognitive issues that are new. She has to ambulate with a cane because of her gait instability.  She is very limited in her exertional activity due to the fatigue and weakness. She's had no other side effects on the medication. Patient also has decreased coordination in her hands, difficulty focusing her eyes and is mildly cognitively slow       She's had no other new medical problem complication or illnesses    Past Medical History:   Diagnosis Date    Alopecia     Essential hypertension     Multiple sclerosis ([facility] [address]     Neurological disorder       History reviewed. No pertinent surgical history. Family History   Problem Relation Age of Onset    Cancer Mother         kidney    Heart Disease Mother     Kidney Disease Mother     Diabetes Mother     Hypertension Mother     Other Father         GSW    Heart Disease Brother     Hypertension Brother       Social History     Tobacco Use    Smoking status: Never Smoker    Smokeless tobacco: Never Used   Substance Use Topics    Alcohol use: No       Current Outpatient Medications   Medication Sig Dispense Refill    furosemide (LASIX) 20 mg tablet 20 mg = 1 tab each dose, PO, daily, # 90 tab, 3 Refills, Pharmacy: [facility]/pharmacy #[id number]     DULoxetine (CYMBALTA) 30 mg capsule TAKE 1 CAPSULE BY MOUTH EVERY DAY      famotidine (PEPCID) 40 mg tablet 40 mg = 1 tab each dose, PO, bedtime, # 90 tab, 3 Refills, Pharmacy: [facility]/pharmacy #[id number]     amLODIPine-benazepril (LOTREL) 5-20 mg per capsule Take 1 Capsule by mouth daily.  fexofenadine (ALLEGRA) 180 mg tablet Take 180 mg by mouth daily.  cholecalciferol (VITAMIN D3) (2,000 UNITS /50 MCG) cap capsule Take  by mouth daily.       teriflunomide (Aubagio) 14 mg tablet TAKE 1 TABLET DAILY WITH DINNER 90 Tab 5        Allergies   Allergen Reactions    Doxycycline Hives    Bystolic [Nebivolol] Other (comments)     Chest pain    Losartan Itching    Lotrel [Amlodipine-Benazepril] Swelling        Review of Systems:  A comprehensive review of systems was negative except for: Musculoskeletal: positive for myalgias, arthralgias and stiff joints  Neurological: positive for memory problems, paresthesia, coordination problems, gait problems and weakness     Objective:     I      NEUROLOGICAL EXAM:    Appearance: The patient is well developed, well nourished, provides a fair history and is in no acute distress. Patient has multiple skin lesions, including nodules and papules and darkened rashes from previous lesions on her legs and arms and hands   Mental Status: Oriented to time, place and person and the president, but patient has difficulty doing serial sevens and remembering 3 of 3 words at 30 seconds, and doing math, and drawing a clock showing the time 10:50, and does have some mild cognitive impairment. . Mood and affect appropriate, but depressed. Cranial Nerves:   Intact visual fields. Fundi are benign, with mild bilateral optic pallor seen. HILDA, EOM's full, no nystagmus, no ptosis. Facial sensation is normal. Corneal reflexes are not tested. Facial movement is symmetric. Hearing is normal bilaterally. Palate is midline with normal sternocleidomastoid and trapezius muscles are normal. Tongue is midline. Neck is supple without meningismus or bruits  Temporal arteries are not tender or enlarged   Motor:  4/5 strength in upper and lower proximal and distal muscles, left Side seems weaker than the right . Normal bulk and increased tone. No fasciculations. Patient has moderate spastic weakness in all extremities with decreased coordination. Rapid alternating movement is slow in all extremities. Reflexes:   Deep tendon reflexes 3+/4 and symmetrical. No clear Babinski or clonus present   Sensory:   Normal to touch, pinprick and vibration mildly decreased in both lower extremities. Double simultaneous stimulation is intact   Gait:  Abnormal gait due to bilateral spastic weakness L>R, patient can walk without assistive devices, but is slow due to her spastic paraparetic gait, and walks with a marked limp. Tremor:   No tremor noted. Cerebellar:  Abnormal Romberg and tandem cerebellar signs present, and finger-nose-finger examination is unremarkable. Neurovascular:  Normal heart sounds and regular rhythm, peripheral pulses decreased, and no carotid bruits. Assessment:     Plan:     Patient with new problem of increasing nausea and GI upset and diarrhea, and family agrees to hold on the Cymbalta till we see if that is the cause of her symptoms. She will call back in 3 days we can put her on something else like gabapentin or nortriptyline again, we will see how she does first.  Patient with problem of worsening leg pain and left arm pain that sounds like it might be some radicular component, particular in her leg, but the MRI scan did not show surgical disease, EMG study did not show clear radiculopathy, just mild borderline left carpal tunnel syndrome, and she got better with some physical therapy so we will continue that for now and hold on intervention. Patient with slowly worsening MS, and can no longer do her job well, and clearly is disabled, we advised her to apply for disability because of her cognitive difficulties, mental impairment, increasing visual symptoms secondary to her optic neuropathies, and marked mental and physical fatigue and quadriplegia, but she is still trying to work 2 jobs. .  Relapsing remitting multiple sclerosis with moderate to severe disability, but no acute exacerbation recently but patient had recent cervical MRI scan July 2020 that showed stable disease in the cervical spine, and mild arthritis, and she has had an MRI of the brain July 2020 that also showed stable disease since 2014.   We will get metabolic parameters to check the safety of her Aubagio but she is tolerating it well without side effects  Patient will continue aubagio   We will continue to follow, and blood work was drawn today   She is to continue to work with her dermatologist and use Vistaril to use p.r.n. for itching  She is to continue her current medications and vitamins and vitamin D and remain mentally and physically active as possible. Patient seen in office today for 32 minutes, over medication, discussing therapy and treatment evaluation and she finally agrees to follow advice after much counseling and controlling  Followup visit in 6 months time.   Metabolic parameters will check today    Signed By: Ana West MD     June 1, 2021

## 2021-06-03 ENCOUNTER — TELEPHONE (OUTPATIENT)
Dept: NEUROLOGY | Age: 51
End: 2021-06-03

## 2021-06-03 NOTE — TELEPHONE ENCOUNTER
Per your office note, it seems as though you are holding on this medication. I received a refill request for this medication.     Please note in here about this

## 2021-06-04 LAB
GAMMA INTERFERON BACKGROUND BLD IA-ACNC: 0 IU/ML
M TB IFN-G BLD-IMP: NEGATIVE
M TB IFN-G CD4+ BCKGRND COR BLD-ACNC: 0 IU/ML
MITOGEN IGNF BLD-ACNC: 7.38 IU/ML
QUANTIFERON INCUBATION, QF1T: NORMAL
QUANTIFERON TB2 AG: 0 IU/ML
SERVICE CMNT-IMP: NORMAL

## 2021-06-04 NOTE — TELEPHONE ENCOUNTER
Teri Stock MD  You 17 hours ago (9:13 PM)   TS  I assume you are talking about Cymbalta, so do not give her a refill until we see whether or not holding it helps her nausea

## 2021-06-11 RX ORDER — DULOXETIN HYDROCHLORIDE 30 MG/1
CAPSULE, DELAYED RELEASE ORAL
Qty: 45 CAPSULE | Refills: 2 | Status: SHIPPED | OUTPATIENT
Start: 2021-06-11 | End: 2021-06-28 | Stop reason: SDUPTHER

## 2021-06-28 RX ORDER — DULOXETIN HYDROCHLORIDE 30 MG/1
CAPSULE, DELAYED RELEASE ORAL
Qty: 90 CAPSULE | Refills: 1 | Status: SHIPPED | OUTPATIENT
Start: 2021-06-28 | End: 2022-03-08 | Stop reason: ALTCHOICE

## 2021-06-28 NOTE — TELEPHONE ENCOUNTER
Future Appointments   Date Time Provider Arjun Galaviz   12/14/2021  3:00 PM Camila Abbott MD [facility]                         Last Appointment My Department:  6/1/2021    Please review and send in refill below if warranted.  90 day request    Requested Prescriptions     Pending Prescriptions Disp Refills    DULoxetine (CYMBALTA) 30 mg capsule 90 Capsule 1     Sig: TAKE 1 CAPSULE BY MOUTH EVERY DAY

## 2021-09-01 DIAGNOSIS — M47.22 CERVICAL RADICULOPATHY DUE TO DEGENERATIVE JOINT DISEASE OF SPINE: ICD-10-CM

## 2021-09-01 DIAGNOSIS — R41.3 MEMORY LOSS: ICD-10-CM

## 2021-09-01 DIAGNOSIS — G82.50 QUADRIPARESIS (HCC): ICD-10-CM

## 2021-09-01 DIAGNOSIS — M54.16 LUMBAR BACK PAIN WITH RADICULOPATHY AFFECTING LEFT LOWER EXTREMITY: ICD-10-CM

## 2021-09-01 DIAGNOSIS — G35 OPTIC NEURITIS DUE TO MULTIPLE SCLEROSIS (HCC): ICD-10-CM

## 2021-09-01 DIAGNOSIS — H46.9 OPTIC NEURITIS DUE TO MULTIPLE SCLEROSIS (HCC): ICD-10-CM

## 2021-09-01 DIAGNOSIS — R27.0 ATAXIA: ICD-10-CM

## 2021-09-01 DIAGNOSIS — M54.16 LUMBAR BACK PAIN WITH RADICULOPATHY AFFECTING RIGHT LOWER EXTREMITY: ICD-10-CM

## 2021-09-01 DIAGNOSIS — G35 MULTIPLE SCLEROSIS (HCC): ICD-10-CM

## 2021-09-01 DIAGNOSIS — R53.1 WEAKNESS GENERALIZED: ICD-10-CM

## 2022-03-08 ENCOUNTER — OFFICE VISIT (OUTPATIENT)
Dept: NEUROLOGY | Age: 52
End: 2022-03-08
Payer: COMMERCIAL

## 2022-03-08 VITALS
HEART RATE: 85 BPM | DIASTOLIC BLOOD PRESSURE: 88 MMHG | RESPIRATION RATE: 18 BRPM | TEMPERATURE: 97.6 F | WEIGHT: 203 LBS | OXYGEN SATURATION: 96 % | SYSTOLIC BLOOD PRESSURE: 138 MMHG | BODY MASS INDEX: 31.79 KG/M2

## 2022-03-08 DIAGNOSIS — G82.50 QUADRIPARESIS (HCC): ICD-10-CM

## 2022-03-08 DIAGNOSIS — H46.9 OPTIC NEURITIS DUE TO MULTIPLE SCLEROSIS (HCC): ICD-10-CM

## 2022-03-08 DIAGNOSIS — Z51.81 ENCOUNTER FOR MEDICATION MONITORING: ICD-10-CM

## 2022-03-08 DIAGNOSIS — G35 MULTIPLE SCLEROSIS (HCC): ICD-10-CM

## 2022-03-08 DIAGNOSIS — R41.3 MEMORY LOSS: ICD-10-CM

## 2022-03-08 DIAGNOSIS — R53.1 WEAKNESS GENERALIZED: ICD-10-CM

## 2022-03-08 DIAGNOSIS — G35 OPTIC NEURITIS DUE TO MULTIPLE SCLEROSIS (HCC): ICD-10-CM

## 2022-03-08 DIAGNOSIS — M54.16 LUMBAR BACK PAIN WITH RADICULOPATHY AFFECTING RIGHT LOWER EXTREMITY: ICD-10-CM

## 2022-03-08 DIAGNOSIS — G56.02 CARPAL TUNNEL SYNDROME OF LEFT WRIST: Primary | ICD-10-CM

## 2022-03-08 DIAGNOSIS — M54.16 LUMBAR BACK PAIN WITH RADICULOPATHY AFFECTING LEFT LOWER EXTREMITY: ICD-10-CM

## 2022-03-08 DIAGNOSIS — M47.22 CERVICAL RADICULOPATHY DUE TO DEGENERATIVE JOINT DISEASE OF SPINE: ICD-10-CM

## 2022-03-08 PROCEDURE — 99214 OFFICE O/P EST MOD 30 MIN: CPT | Performed by: PSYCHIATRY & NEUROLOGY

## 2022-03-08 RX ORDER — DULOXETIN HYDROCHLORIDE 20 MG/1
20 CAPSULE, DELAYED RELEASE ORAL
Qty: 30 CAPSULE | Refills: 11 | Status: SHIPPED | OUTPATIENT
Start: 2022-03-08 | End: 2022-04-04 | Stop reason: SDUPTHER

## 2022-03-08 RX ORDER — MULTIVIT WITH MINERALS/HERBS
1 TABLET ORAL DAILY
COMMUNITY

## 2022-03-08 NOTE — LETTER
3/8/2022    Patient: Mary Kim   YOB: 1970   Date of Visit: 3/8/2022     Luis Lopez MD  [address] [place] [address]  Via Fax: [phone]    Dear Luis Lopez MD,      Thank you for referring Ms. Karina Garcia to [address] [place] [address] for evaluation. My notes for this consultation are attached. Consult    Subjective:     Mary Kim is a 46 y. o.right-handed Afro-American female seen for evaluation at the request of Dr. Vasu Vinson of new problem of having more muscle pain in her legs, which may be just neuropathic type pain, from her MS and myelopathic type pain, and combination with her joint pain and musculoskeletal pain, and she felt better on the Cymbalta 30 mg a day but did have some GI upset, but would like to try the lower dose of medication because it really did help her pain. We will send in the 20 mg dose and see how she tolerates that, she is to going to make sure that she takes it with food and I told her to take it at supper with her other medications. She also had increased headaches on nortriptyline and had to stop that. She has not had any major flareup with her MS and is on Aubagio once a day. Her last MRI scans were done in July 2020 and showed stable white matter disease, and she has not had any clear exacerbations since her last visit 1 year ago. The patient has not had any recent blood test done for being on the Aubagio so we recheck that again to make sure that her white count and liver functions are stable. She takes her multivitamins and vitamin D on a regular basis and her medications of Aubagio. We offered the patient gabapentin for her pain but she wants to try the Cymbalta instead. Aubagio can cause nausea initially, but she has been taking it for a while and had no side effect previously.   Patient's blood pressure still somewhat elevated and she is now seeing a cardiologist to try to help adjust that and get it back under control. Patient feels increased in pain in her legs, particular when she is standing up and bearing weight for period of time, with the pain being an aching sensation from the knees down, occasionally into the left hip and radiating from the back region, left side greater than right, and occasionally even having some intermittent radiating into her left arm. It all sounds like degenerative arthritis and maybe some mild radiculopathy but she is very upset about it, and since she never has pain when she is laying flat, and occasionally when she is sitting the pain will occur, but is mainly when she is standing and I told her that probably not her MS, because it would be more persistent. Patient had an MRI scan of her lumbar spine in March 2021 that showed moderate degenerative changes, but no surgical disease, she was given physical therapy and got better but she continues to have discomfort we encouraged her to make sure she keeps doing her exercises. Her EMG study just showed mild carpal tunnel syndrome on the left but no other significant motor radiculopathy in the legs. Patient has had no other side effect on medication, and is tolerating it well. She's had MS over 25 years. She has bilateral weakness, cognitive issues in coordination issues and gait tissues and fatigue. The patient is clearly disabled. She has not had any acute exacerbations in the last months. She has had no new focal weakness sensory loss vision problems or cognitive issues that are new. She has to ambulate with a cane because of her gait instability. She is very limited in her exertional activity due to the fatigue and weakness. She's had no other side effects on the medication.   Patient also has decreased coordination in her hands, difficulty focusing her eyes and is mildly cognitively slow       She's had no other new medical problem complication or illnesses    Past Medical History:   Diagnosis Date    Alopecia     Essential hypertension     Multiple sclerosis ([facility] [address]     Neurological disorder       No past surgical history on file. Family History   Problem Relation Age of Onset   [facility] Cancer Mother         kidney    Heart Disease Mother     Kidney Disease Mother     Diabetes Mother     Hypertension Mother     Other Father         GSW    Heart Disease Brother     Hypertension Brother       Social History     Tobacco Use    Smoking status: Never Smoker    Smokeless tobacco: Never Used   Substance Use Topics    Alcohol use: No       Current Outpatient Medications   Medication Sig Dispense Refill    DULoxetine (CYMBALTA) 20 mg capsule Take 1 Capsule by mouth daily (with dinner). 30 Capsule 11    teriflunomide (Aubagio) 14 mg tablet TAKE 1 TABLET DAILY WITH DINNER 90 Tablet 5    furosemide (LASIX) 20 mg tablet 20 mg = 1 tab each dose, PO, daily, # 90 tab, 3 Refills, Pharmacy: [facility]/pharmacy #[id number]     famotidine (PEPCID) 40 mg tablet 40 mg = 1 tab each dose, PO, bedtime, # 90 tab, 3 Refills, Pharmacy: [facility]/pharmacy #[id number]     amLODIPine-benazepril (LOTREL) 5-20 mg per capsule Take 1 Capsule by mouth daily.  fexofenadine (ALLEGRA) 180 mg tablet Take 180 mg by mouth daily.  cholecalciferol (VITAMIN D3) (2,000 UNITS /50 MCG) cap capsule Take  by mouth daily.  b complex vitamins tablet Take 1 Tablet by mouth daily. Allergies   Allergen Reactions    Doxycycline Hives    Bystolic [Nebivolol] Other (comments)     Chest pain    Losartan Itching    Lotrel [Amlodipine-Benazepril] Swelling        Review of Systems:  A comprehensive review of systems was negative except for: Musculoskeletal: positive for myalgias, arthralgias and stiff joints  Neurological: positive for memory problems, paresthesia, coordination problems, gait problems and weakness     Objective:     I      NEUROLOGICAL EXAM:    Appearance:   The patient is well developed, well nourished, provides a fair history and is in no acute distress. Patient has multiple skin lesions, including nodules and papules and darkened rashes from previous lesions on her legs and arms and hands   Mental Status: Oriented to time, place and person and the president, but patient has difficulty doing serial sevens and remembering 3 of 3 words at 30 seconds, and doing math, and drawing a clock showing the time 10:50, and does have some mild cognitive impairment. . Mood and affect appropriate, but depressed. Cranial Nerves:   Intact visual fields. Fundi are benign, with mild bilateral optic pallor seen. HILDA, EOM's full, no nystagmus, no ptosis. Facial sensation is normal. Corneal reflexes are not tested. Facial movement is symmetric. Hearing is normal bilaterally. Palate is midline with normal sternocleidomastoid and trapezius muscles are normal. Tongue is midline. Neck is supple without meningismus or bruits  Temporal arteries are not tender or enlarged   Motor:  4/5 strength in upper and lower proximal and distal muscles, left Side seems weaker than the right . Normal bulk and increased tone. No fasciculations. Patient has moderate spastic weakness in all extremities with decreased coordination. Rapid alternating movement is slow in all extremities. Reflexes:   Deep tendon reflexes 3+/4 and symmetrical. No clear Babinski or clonus present   Sensory:   Normal to touch, pinprick and vibration mildly decreased in both lower extremities. Double simultaneous stimulation is intact   Gait:  Abnormal gait due to bilateral spastic weakness L>R, patient can walk without assistive devices, but is slow due to her spastic paraparetic gait, and walks with a marked limp and uses a cane for ambulation at times. Tremor:   No tremor noted. Cerebellar:  Abnormal Romberg and tandem cerebellar signs present, and finger-nose-finger examination is unremarkable. Neurovascular:  Normal heart sounds and regular rhythm, peripheral pulses decreased, and no carotid bruits. Assessment:     Plan:     Patient wants to try lower dose Cymbalta because it works so well for her pain, we sent in a 20 mg dose, and that we offered her gabapentin she wants to try the Cymbalta instead. She will take it with supper to try to avoid GI side effects. She will call if she has any problems and was intolerant of nortriptyline in the past also. Patient with problem of worsening leg pain and left arm pain that sounds like it might be some radicular component, particular in her leg, but the MRI scan did not show surgical disease, EMG study did not show clear radiculopathy, just mild borderline left carpal tunnel syndrome, and she got better with some physical therapy so we will continue that for now and hold on intervention. Patient with slowly worsening MS, and can no longer do her job well, and clearly is disabled, and patient now on disability because of her cognitive difficulties, mental impairment, increasing visual symptoms secondary to her optic neuropathies, and marked mental and physical fatigue and quadriplegia, but she is still trying to work 2 jobs. .  Relapsing remitting multiple sclerosis with moderate to severe disability, but no acute exacerbation recently but patient had recent cervical MRI scan July 2020 that showed stable disease in the cervical spine, and mild arthritis, and she has had an MRI of the brain July 2020 that also showed stable disease since 2014. Patient has a lot of back pain, but her MRI of the lumbar spine did not show surgical disease, and we have to treat that with physical therapy and medications.   We will get metabolic parameters to check the safety of her Aubagio but she is tolerating it well without side effects  Patient will continue aubagio and blood work was drawn today   She is to continue to work with her dermatologist and use Vistaril to use p.r.n. for itching  She is to continue her current medications and vitamins and vitamin D and remain mentally and physically active as possible. Patient seen in office today for 35 minutes, over medication, discussing therapy and treatment evaluation and she finally agrees to follow advice after much counseling and controlling  Followup visit in 6 months time. Metabolic parameters will check today    Signed By: Hussain Fonseca MD     March 8, 2022           If you have questions, please do not hesitate to call me. I look forward to following your patient along with you.       Sincerely,    Hussain Fonseca MD

## 2022-03-08 NOTE — PROGRESS NOTES
Consult    Subjective:     Shirley Mcdonald is a 46 y. o.right-handed Afro-American female seen for evaluation at the request of Dr. Sheri Blackwood of new problem of having more muscle pain in her legs, which may be just neuropathic type pain, from her MS and myelopathic type pain, and combination with her joint pain and musculoskeletal pain, and she felt better on the Cymbalta 30 mg a day but did have some GI upset, but would like to try the lower dose of medication because it really did help her pain. We will send in the 20 mg dose and see how she tolerates that, she is to going to make sure that she takes it with food and I told her to take it at supper with her other medications. She also had increased headaches on nortriptyline and had to stop that. She has not had any major flareup with her MS and is on Aubagio once a day. Her last MRI scans were done in July 2020 and showed stable white matter disease, and she has not had any clear exacerbations since her last visit 1 year ago. The patient has not had any recent blood test done for being on the Aubagio so we recheck that again to make sure that her white count and liver functions are stable. She takes her multivitamins and vitamin D on a regular basis and her medications of Aubagio. We offered the patient gabapentin for her pain but she wants to try the Cymbalta instead. Aubagio can cause nausea initially, but she has been taking it for a while and had no side effect previously. Patient's blood pressure still somewhat elevated and she is now seeing a cardiologist to try to help adjust that and get it back under control.   Patient feels increased in pain in her legs, particular when she is standing up and bearing weight for period of time, with the pain being an aching sensation from the knees down, occasionally into the left hip and radiating from the back region, left side greater than right, and occasionally even having some intermittent radiating into her left arm. It all sounds like degenerative arthritis and maybe some mild radiculopathy but she is very upset about it, and since she never has pain when she is laying flat, and occasionally when she is sitting the pain will occur, but is mainly when she is standing and I told her that probably not her MS, because it would be more persistent. Patient had an MRI scan of her lumbar spine in March 2021 that showed moderate degenerative changes, but no surgical disease, she was given physical therapy and got better but she continues to have discomfort we encouraged her to make sure she keeps doing her exercises. Her EMG study just showed mild carpal tunnel syndrome on the left but no other significant motor radiculopathy in the legs. Patient has had no other side effect on medication, and is tolerating it well. She's had MS over 25 years. She has bilateral weakness, cognitive issues in coordination issues and gait tissues and fatigue. The patient is clearly disabled. She has not had any acute exacerbations in the last months. She has had no new focal weakness sensory loss vision problems or cognitive issues that are new. She has to ambulate with a cane because of her gait instability. She is very limited in her exertional activity due to the fatigue and weakness. She's had no other side effects on the medication. Patient also has decreased coordination in her hands, difficulty focusing her eyes and is mildly cognitively slow       She's had no other new medical problem complication or illnesses    Past Medical History:   Diagnosis Date    Alopecia     Essential hypertension     Multiple sclerosis ([facility] [address]     Neurological disorder       No past surgical history on file.   Family History   Problem Relation Age of Onset   [facility] Cancer Mother         kidney    Heart Disease Mother     Kidney Disease Mother     Diabetes Mother     Hypertension Mother     Other Father         GSW    Heart Disease Brother     Hypertension Brother       Social History     Tobacco Use    Smoking status: Never Smoker    Smokeless tobacco: Never Used   Substance Use Topics    Alcohol use: No       Current Outpatient Medications   Medication Sig Dispense Refill    DULoxetine (CYMBALTA) 20 mg capsule Take 1 Capsule by mouth daily (with dinner). 30 Capsule 11    teriflunomide (Aubagio) 14 mg tablet TAKE 1 TABLET DAILY WITH DINNER 90 Tablet 5    furosemide (LASIX) 20 mg tablet 20 mg = 1 tab each dose, PO, daily, # 90 tab, 3 Refills, Pharmacy: [facility]/pharmacy #[id number]     famotidine (PEPCID) 40 mg tablet 40 mg = 1 tab each dose, PO, bedtime, # 90 tab, 3 Refills, Pharmacy: [facility]/pharmacy #[id number]     amLODIPine-benazepril (LOTREL) 5-20 mg per capsule Take 1 Capsule by mouth daily.  fexofenadine (ALLEGRA) 180 mg tablet Take 180 mg by mouth daily.  cholecalciferol (VITAMIN D3) (2,000 UNITS /50 MCG) cap capsule Take  by mouth daily.  b complex vitamins tablet Take 1 Tablet by mouth daily. Allergies   Allergen Reactions    Doxycycline Hives    Bystolic [Nebivolol] Other (comments)     Chest pain    Losartan Itching    Lotrel [Amlodipine-Benazepril] Swelling        Review of Systems:  A comprehensive review of systems was negative except for: Musculoskeletal: positive for myalgias, arthralgias and stiff joints  Neurological: positive for memory problems, paresthesia, coordination problems, gait problems and weakness     Objective:     I      NEUROLOGICAL EXAM:    Appearance: The patient is well developed, well nourished, provides a fair history and is in no acute distress.   Patient has multiple skin lesions, including nodules and papules and darkened rashes from previous lesions on her legs and arms and hands   Mental Status: Oriented to time, place and person and the president, but patient has difficulty doing serial sevens and remembering 3 of 3 words at 30 seconds, and doing math, and drawing a clock showing the time 10:50, and does have some mild cognitive impairment. . Mood and affect appropriate, but depressed. Cranial Nerves:   Intact visual fields. Fundi are benign, with mild bilateral optic pallor seen. HILDA, EOM's full, no nystagmus, no ptosis. Facial sensation is normal. Corneal reflexes are not tested. Facial movement is symmetric. Hearing is normal bilaterally. Palate is midline with normal sternocleidomastoid and trapezius muscles are normal. Tongue is midline. Neck is supple without meningismus or bruits  Temporal arteries are not tender or enlarged   Motor:  4/5 strength in upper and lower proximal and distal muscles, left Side seems weaker than the right . Normal bulk and increased tone. No fasciculations. Patient has moderate spastic weakness in all extremities with decreased coordination. Rapid alternating movement is slow in all extremities. Reflexes:   Deep tendon reflexes 3+/4 and symmetrical. No clear Babinski or clonus present   Sensory:   Normal to touch, pinprick and vibration mildly decreased in both lower extremities. Double simultaneous stimulation is intact   Gait:  Abnormal gait due to bilateral spastic weakness L>R, patient can walk without assistive devices, but is slow due to her spastic paraparetic gait, and walks with a marked limp and uses a cane for ambulation at times. Tremor:   No tremor noted. Cerebellar:  Abnormal Romberg and tandem cerebellar signs present, and finger-nose-finger examination is unremarkable. Neurovascular:  Normal heart sounds and regular rhythm, peripheral pulses decreased, and no carotid bruits. Assessment:     Plan:     Patient wants to try lower dose Cymbalta because it works so well for her pain, we sent in a 20 mg dose, and that we offered her gabapentin she wants to try the Cymbalta instead. She will take it with supper to try to avoid GI side effects.   She will call if she has any problems and was intolerant of nortriptyline in the past also.  Patient with problem of worsening leg pain and left arm pain that sounds like it might be some radicular component, particular in her leg, but the MRI scan did not show surgical disease, EMG study did not show clear radiculopathy, just mild borderline left carpal tunnel syndrome, and she got better with some physical therapy so we will continue that for now and hold on intervention. Patient with slowly worsening MS, and can no longer do her job well, and clearly is disabled, and patient now on disability because of her cognitive difficulties, mental impairment, increasing visual symptoms secondary to her optic neuropathies, and marked mental and physical fatigue and quadriplegia, but she is still trying to work 2 jobs. .  Relapsing remitting multiple sclerosis with moderate to severe disability, but no acute exacerbation recently but patient had recent cervical MRI scan July 2020 that showed stable disease in the cervical spine, and mild arthritis, and she has had an MRI of the brain July 2020 that also showed stable disease since 2014. Patient has a lot of back pain, but her MRI of the lumbar spine did not show surgical disease, and we have to treat that with physical therapy and medications. We will get metabolic parameters to check the safety of her Aubagio but she is tolerating it well without side effects  Patient will continue aubagio and blood work was drawn today   She is to continue to work with her dermatologist and use Vistaril to use p.r.n. for itching  She is to continue her current medications and vitamins and vitamin D and remain mentally and physically active as possible. Patient seen in office today for 35 minutes, over medication, discussing therapy and treatment evaluation and she finally agrees to follow advice after much counseling and controlling  Followup visit in 6 months time.   Metabolic parameters will check today    Signed By: Axel Conley MD     March 8, 2022

## 2022-03-18 PROBLEM — M54.16 LUMBAR BACK PAIN WITH RADICULOPATHY AFFECTING LEFT LOWER EXTREMITY: Status: ACTIVE | Noted: 2020-11-30

## 2022-03-18 PROBLEM — Z51.81 ENCOUNTER FOR MEDICATION MONITORING: Status: ACTIVE | Noted: 2022-03-08

## 2022-03-19 PROBLEM — M54.16 LUMBAR BACK PAIN WITH RADICULOPATHY AFFECTING RIGHT LOWER EXTREMITY: Status: ACTIVE | Noted: 2020-11-30

## 2022-03-19 PROBLEM — G56.02 CARPAL TUNNEL SYNDROME OF LEFT WRIST: Status: ACTIVE | Noted: 2020-12-09

## 2022-03-20 PROBLEM — M47.22 CERVICAL RADICULOPATHY DUE TO DEGENERATIVE JOINT DISEASE OF SPINE: Status: ACTIVE | Noted: 2020-11-30

## 2022-04-04 DIAGNOSIS — M54.16 LUMBAR BACK PAIN WITH RADICULOPATHY AFFECTING RIGHT LOWER EXTREMITY: ICD-10-CM

## 2022-04-04 DIAGNOSIS — M54.16 LUMBAR BACK PAIN WITH RADICULOPATHY AFFECTING LEFT LOWER EXTREMITY: ICD-10-CM

## 2022-04-04 DIAGNOSIS — G56.02 CARPAL TUNNEL SYNDROME OF LEFT WRIST: ICD-10-CM

## 2022-04-04 DIAGNOSIS — Z51.81 ENCOUNTER FOR MEDICATION MONITORING: ICD-10-CM

## 2022-04-04 DIAGNOSIS — H46.9 OPTIC NEURITIS DUE TO MULTIPLE SCLEROSIS (HCC): ICD-10-CM

## 2022-04-04 DIAGNOSIS — M47.22 CERVICAL RADICULOPATHY DUE TO DEGENERATIVE JOINT DISEASE OF SPINE: ICD-10-CM

## 2022-04-04 DIAGNOSIS — G35 MULTIPLE SCLEROSIS (HCC): ICD-10-CM

## 2022-04-04 DIAGNOSIS — G35 OPTIC NEURITIS DUE TO MULTIPLE SCLEROSIS (HCC): ICD-10-CM

## 2022-04-04 DIAGNOSIS — R53.1 WEAKNESS GENERALIZED: ICD-10-CM

## 2022-04-04 DIAGNOSIS — G82.50 QUADRIPARESIS (HCC): ICD-10-CM

## 2022-04-04 DIAGNOSIS — R41.3 MEMORY LOSS: ICD-10-CM

## 2022-04-04 RX ORDER — DULOXETIN HYDROCHLORIDE 20 MG/1
20 CAPSULE, DELAYED RELEASE ORAL
Qty: 90 CAPSULE | Refills: 3 | Status: SHIPPED | OUTPATIENT
Start: 2022-04-04

## 2022-05-27 ENCOUNTER — TELEPHONE (OUTPATIENT)
Dept: NEUROLOGY | Age: 52
End: 2022-05-27

## 2022-05-30 ENCOUNTER — TELEPHONE (OUTPATIENT)
Dept: NEUROLOGY | Age: 52
End: 2022-05-30

## 2022-05-31 ENCOUNTER — TELEPHONE (OUTPATIENT)
Dept: NEUROLOGY | Age: 52
End: 2022-05-31

## 2022-05-31 NOTE — TELEPHONE ENCOUNTER
Jef approval   Case [id number]  5/30/22  - 5/30/23    Faxed to [facility].      C: faxed to [employer]

## 2022-05-31 NOTE — TELEPHONE ENCOUNTER
Aubagio PA - submitted - approved. Stefania POTTS Case ID: [id number] help?  Call us at [phone]  Outcome  Approved today  PA Case: [id number], Status: Approved, Coverage Starts on: 5/30/2022 12:00:00 AM, Coverage Ends on: 5/30/2023 12:00:00 AM.

## 2022-06-21 DIAGNOSIS — M54.16 LUMBAR BACK PAIN WITH RADICULOPATHY AFFECTING RIGHT LOWER EXTREMITY: ICD-10-CM

## 2022-06-21 DIAGNOSIS — R41.3 MEMORY LOSS: ICD-10-CM

## 2022-06-21 DIAGNOSIS — R27.0 ATAXIA: ICD-10-CM

## 2022-06-21 DIAGNOSIS — H46.9 OPTIC NEURITIS DUE TO MULTIPLE SCLEROSIS (HCC): ICD-10-CM

## 2022-06-21 DIAGNOSIS — G35 OPTIC NEURITIS DUE TO MULTIPLE SCLEROSIS (HCC): ICD-10-CM

## 2022-06-21 DIAGNOSIS — R53.1 WEAKNESS GENERALIZED: ICD-10-CM

## 2022-06-21 DIAGNOSIS — G35 MULTIPLE SCLEROSIS (HCC): ICD-10-CM

## 2022-06-21 DIAGNOSIS — G82.50 QUADRIPARESIS (HCC): ICD-10-CM

## 2022-06-21 DIAGNOSIS — M54.16 LUMBAR BACK PAIN WITH RADICULOPATHY AFFECTING LEFT LOWER EXTREMITY: ICD-10-CM

## 2022-06-21 DIAGNOSIS — M47.22 CERVICAL RADICULOPATHY DUE TO DEGENERATIVE JOINT DISEASE OF SPINE: ICD-10-CM

## 2022-11-03 DIAGNOSIS — G82.50 QUADRIPARESIS (HCC): ICD-10-CM

## 2022-11-03 DIAGNOSIS — M54.16 LUMBAR BACK PAIN WITH RADICULOPATHY AFFECTING RIGHT LOWER EXTREMITY: ICD-10-CM

## 2022-11-03 DIAGNOSIS — M47.22 CERVICAL RADICULOPATHY DUE TO DEGENERATIVE JOINT DISEASE OF SPINE: ICD-10-CM

## 2022-11-03 DIAGNOSIS — H46.9 OPTIC NEURITIS DUE TO MULTIPLE SCLEROSIS (HCC): ICD-10-CM

## 2022-11-03 DIAGNOSIS — R27.0 ATAXIA: ICD-10-CM

## 2022-11-03 DIAGNOSIS — G35 OPTIC NEURITIS DUE TO MULTIPLE SCLEROSIS (HCC): ICD-10-CM

## 2022-11-03 DIAGNOSIS — R53.1 WEAKNESS GENERALIZED: ICD-10-CM

## 2022-11-03 DIAGNOSIS — R41.3 MEMORY LOSS: ICD-10-CM

## 2022-11-03 DIAGNOSIS — G35 MULTIPLE SCLEROSIS (HCC): ICD-10-CM

## 2022-11-03 DIAGNOSIS — M54.16 LUMBAR BACK PAIN WITH RADICULOPATHY AFFECTING LEFT LOWER EXTREMITY: ICD-10-CM

## 2023-02-09 DIAGNOSIS — M54.16 LUMBAR BACK PAIN WITH RADICULOPATHY AFFECTING RIGHT LOWER EXTREMITY: ICD-10-CM

## 2023-02-09 DIAGNOSIS — M47.22 CERVICAL RADICULOPATHY DUE TO DEGENERATIVE JOINT DISEASE OF SPINE: ICD-10-CM

## 2023-02-09 DIAGNOSIS — G82.50 QUADRIPARESIS (HCC): ICD-10-CM

## 2023-02-09 DIAGNOSIS — M54.16 LUMBAR BACK PAIN WITH RADICULOPATHY AFFECTING LEFT LOWER EXTREMITY: ICD-10-CM

## 2023-02-09 DIAGNOSIS — H46.9 OPTIC NEURITIS DUE TO MULTIPLE SCLEROSIS (HCC): ICD-10-CM

## 2023-02-09 DIAGNOSIS — G35 OPTIC NEURITIS DUE TO MULTIPLE SCLEROSIS (HCC): ICD-10-CM

## 2023-02-09 DIAGNOSIS — R27.0 ATAXIA: ICD-10-CM

## 2023-02-09 DIAGNOSIS — R41.3 MEMORY LOSS: ICD-10-CM

## 2023-02-09 DIAGNOSIS — G35 MULTIPLE SCLEROSIS (HCC): ICD-10-CM

## 2023-02-09 DIAGNOSIS — R53.1 WEAKNESS GENERALIZED: ICD-10-CM

## 2023-02-09 NOTE — TELEPHONE ENCOUNTER
Seen 3-8-2022  Filled 11-3-2022    Appt not until Nov 2023, ? Any labs due now since yearly appt should have been March 2023.

## 2023-05-10 ENCOUNTER — TELEPHONE (OUTPATIENT)
Age: 53
End: 2023-05-10

## 2023-05-10 NOTE — TELEPHONE ENCOUNTER
Aubagio approved today    PA Case: [id number], Status: Approved, Coverage Starts on: 5/10/2023 12:00:00 AM, Coverage Ends on: 5/9/2024  [facility]      Faxed auth to [employer]   And YAQUELIN Oliveira  fax [phone]

## 2023-08-15 RX ORDER — TERIFLUNOMIDE 14 MG/1
TABLET, FILM COATED ORAL
Qty: 90 TABLET | Refills: 1 | Status: SHIPPED | OUTPATIENT
Start: 2023-08-15

## 2023-08-15 NOTE — TELEPHONE ENCOUNTER
Requested Prescriptions     Pending Prescriptions Disp Refills    Teriflunomide 14 MG TABS 90 tablet 1     Sig: TAKE 1 TABLET BY MOUTH 1 TIME A DAY WITH DINNER       Allergies:   Allergies   Allergen Reactions    Doxycycline Hives    Losartan Itching    Nebivolol Other (See Comments)     Chest pain       Last visit with ordering provider: 3/8/2022   Next visit with ordering provider: 11/9/2023       Current Outpatient Medications   Medication Instructions    amLODIPine-benazepril (LOTREL) 5-20 MG per capsule 1 capsule, Oral, DAILY    Cholecalciferol 50 MCG (2000 UT) CAPS Oral, DAILY    DULoxetine (CYMBALTA) 20 mg, Oral, DAILY WITH DINNER    famotidine (PEPCID) 40 MG tablet 40 mg = 1 tab each dose, PO, bedtime, # 90 tab, 3 Refills, Pharmacy: [facility]/pharmacy #[id number]   fexofenadine (ALLEGRA) 180 mg, Oral, DAILY    furosemide (LASIX) 20 MG tablet 20 mg = 1 tab each dose, PO, daily, # 90 tab, 3 Refills, Pharmacy: [facility]/pharmacy #[id number]   Teriflunomide 14 MG TABS TAKE 1 TABLET BY MOUTH 1 TIME A DAY WITH DINNER       Signed by Rory FITCH  08/15/23  8:44 AM

## 2023-11-09 ENCOUNTER — OFFICE VISIT (OUTPATIENT)
Age: 53
End: 2023-11-09

## 2023-11-09 VITALS
HEART RATE: 79 BPM | WEIGHT: 199 LBS | SYSTOLIC BLOOD PRESSURE: 158 MMHG | HEIGHT: 67 IN | RESPIRATION RATE: 18 BRPM | OXYGEN SATURATION: 96 % | DIASTOLIC BLOOD PRESSURE: 80 MMHG | BODY MASS INDEX: 31.23 KG/M2

## 2023-11-09 DIAGNOSIS — G35 MULTIPLE SCLEROSIS EXACERBATION (HCC): ICD-10-CM

## 2023-11-09 DIAGNOSIS — M54.16 LUMBAR BACK PAIN WITH RADICULOPATHY AFFECTING LEFT LOWER EXTREMITY: ICD-10-CM

## 2023-11-09 DIAGNOSIS — Z51.81 ENCOUNTER FOR MEDICATION MONITORING: ICD-10-CM

## 2023-11-09 DIAGNOSIS — M47.22 CERVICAL RADICULOPATHY DUE TO DEGENERATIVE JOINT DISEASE OF SPINE: ICD-10-CM

## 2023-11-09 DIAGNOSIS — G35 MULTIPLE SCLEROSIS (HCC): ICD-10-CM

## 2023-11-09 DIAGNOSIS — R41.3 MEMORY LOSS: ICD-10-CM

## 2023-11-09 DIAGNOSIS — R53.1 WEAKNESS GENERALIZED: ICD-10-CM

## 2023-11-09 DIAGNOSIS — G35 OPTIC NEURITIS DUE TO MULTIPLE SCLEROSIS (HCC): Primary | ICD-10-CM

## 2023-11-09 DIAGNOSIS — H46.9 OPTIC NEURITIS DUE TO MULTIPLE SCLEROSIS (HCC): Primary | ICD-10-CM

## 2023-11-09 DIAGNOSIS — R53.82 CHRONIC FATIGUE: ICD-10-CM

## 2023-11-09 RX ORDER — BETAMETHASONE DIPROPIONATE 0.5 MG/G
1 CREAM TOPICAL 2 TIMES DAILY
COMMUNITY
Start: 2021-04-07

## 2023-11-09 RX ORDER — VITAMIN B COMPLEX
1 CAPSULE ORAL DAILY
COMMUNITY

## 2023-11-09 RX ORDER — NYSTATIN 100000 [USP'U]/G
POWDER TOPICAL
COMMUNITY

## 2023-11-09 RX ORDER — ALBUTEROL SULFATE 90 UG/1
2 AEROSOL, METERED RESPIRATORY (INHALATION) EVERY 6 HOURS PRN
COMMUNITY

## 2023-11-09 ASSESSMENT — PATIENT HEALTH QUESTIONNAIRE - PHQ9
SUM OF ALL RESPONSES TO PHQ QUESTIONS 1-9: 0
2. FEELING DOWN, DEPRESSED OR HOPELESS: 0
1. LITTLE INTEREST OR PLEASURE IN DOING THINGS: 0
SUM OF ALL RESPONSES TO PHQ QUESTIONS 1-9: 0
SUM OF ALL RESPONSES TO PHQ9 QUESTIONS 1 & 2: 0
SUM OF ALL RESPONSES TO PHQ QUESTIONS 1-9: 0
SUM OF ALL RESPONSES TO PHQ QUESTIONS 1-9: 0

## 2023-11-10 NOTE — PROGRESS NOTES
1. Have you been to the ER, urgent care clinic since your last visit? Hospitalized since your last visit? Seen on 11/2/23 at [facility] and tested positive for COVID, RSV.    2. Have you seen or consulted any other health care providers outside of the [address] [place] [address] Avenue since your last visit? Include any pap smears or colon screening.    No.      Chief Complaint   Patient presents with    Multiple Sclerosis     Wants to discuss symptoms
pain, but her MRI of the lumbar spine did not show surgical disease, and we have to treat that with physical therapy and medications. We will get metabolic parameters to check the safety of her Aubagio but she is tolerating it well without side effects  Patient will continue aubagio and blood work was drawn today   She is to continue to work with her dermatologist and use Vistaril to use p.r.n. for itching  She is to continue her current medications and vitamins and vitamin D and remain mentally and physically active as possible. Patient seen in office today for 38 minutes, over medication, discussing therapy and treatment evaluation and she finally agrees to follow advice after much counseling and controlling  Followup visit in 6 months time. Metabolic parameters will check today       Signed By: Jose Clarke MD     November 9, 2023       CC:  Sandra Ramsey MD  FAX: [phone]

## 2023-12-27 ENCOUNTER — TELEPHONE (OUTPATIENT)
Age: 53
End: 2023-12-27

## 2023-12-27 NOTE — TELEPHONE ENCOUNTER
Pt called asking if Teriflunomide 14 MG TABS (Aubagio) has any payment assistance programs due to the medication becoming too expensive.    Please call: [phone]

## 2023-12-28 NOTE — TELEPHONE ENCOUNTER
Spoke with patient.  Verified patient with two patient identifiers.    States she had still been on name brand Aubagio but she has $7,000.00 bill and cannot afford it.  States she does not think they will send her the Teriflunomide as she cannot pay the bill she owes.    Advised to call to refill and tell them she wants the Teriflunomide, as that is what the Rx was written for.  If there is a problem and she cannot get it filled due to cost or payment, let us know.    Patient verbalized understanding.

## 2024-01-17 ENCOUNTER — TELEPHONE (OUTPATIENT)
Age: 54
End: 2024-01-17

## 2024-01-17 DIAGNOSIS — G35 MULTIPLE SCLEROSIS (HCC): ICD-10-CM

## 2024-01-17 RX ORDER — TERIFLUNOMIDE 14 MG/1
TABLET, FILM COATED ORAL
Qty: 90 TABLET | Refills: 3 | Status: ACTIVE | OUTPATIENT
Start: 2024-01-17

## 2024-01-17 NOTE — TELEPHONE ENCOUNTER
Patient states she would like to try the generic of the Aubagio. If possible, please send to the specialty pharmacy through the mail as we have before. Thank you!

## 2024-01-17 NOTE — TELEPHONE ENCOUNTER
Pt wants generic sent to [facility] where it was sent in Nov 2023.    Se[facility] 11[facility]9-2023 [facility]    Pt needs to schedule appt.

## 2024-02-08 ENCOUNTER — CLINICAL DOCUMENTATION (OUTPATIENT)
Age: 54
End: 2024-02-08

## 2024-02-08 NOTE — PROGRESS NOTES
[facility] has been shipped on 01/25/2024.

## 2024-06-12 ENCOUNTER — CLINICAL DOCUMENTATION (OUTPATIENT)
Age: 54
End: 2024-06-12

## 2024-06-12 ENCOUNTER — TELEPHONE (OUTPATIENT)
Age: 54
End: 2024-06-12

## 2024-06-12 NOTE — PROGRESS NOTES
I called the patient, reassured her that MS only involves the brain and the spinal cord and the optic nerves, and no other organ of the body, and her respiratory problems have nothing to do with her MS, but we will review her lab studies when they send them to us.

## 2024-06-12 NOTE — TELEPHONE ENCOUNTER
Spoke with patient.  Verified patient with two patient identifiers.    States she started with SOB at rest and with exertion about a week ago.  She saw her PCP who did lab work and CXR.  States some of her lab results were high and she is repeating them in one week.  Not sure of the CXR results.    She is calling her PCP to have them fax us the lab results, CXR report and their OV note so we can see what they found.    Is on the Terifllunomide 14 mg qd, Albuterol inhaler prn, allergra 180 mg qd prn.    States she has not really improved any.  Is wondering if this is MS related.    Pls advise.

## 2024-06-12 NOTE — TELEPHONE ENCOUNTER
Pt would like a call back from nurse to disucss new problems she is having and she is not sure if it is from MS or not. Trouble breathing/shortness of breath is primary concern. Please call.

## 2024-08-09 ENCOUNTER — TELEPHONE (OUTPATIENT)
Age: 54
End: 2024-08-09

## 2024-08-09 ENCOUNTER — CLINICAL DOCUMENTATION (OUTPATIENT)
Age: 54
End: 2024-08-09

## 2024-08-09 DIAGNOSIS — G35 MULTIPLE SCLEROSIS (HCC): ICD-10-CM

## 2024-08-09 RX ORDER — TERIFLUNOMIDE 14 MG/1
TABLET, FILM COATED ORAL
Qty: 90 TABLET | Refills: 3 | Status: ACTIVE | OUTPATIENT
Start: 2024-08-09

## 2024-08-09 NOTE — TELEPHONE ENCOUNTER
Received fax refill request from [facility]  RX NEEDED  RX status inactive    Last office visit: 11/09/2023  Next office visit: 02/13/2025  Last med refill: 01/17/2024

## 2024-08-09 NOTE — TELEPHONE ENCOUNTER
[facility] called for a status update on the PA for Pt's Teriflunomide. Please advise.     Cover My Meds Key:  [id number]

## 2024-08-12 NOTE — TELEPHONE ENCOUNTER
Emailed Jaz this message from [facility]  Teriflunomide   MSOT shows it is approved already to 8-9-25    [facility] a [facility] on [facility] for Pt's Teriflunomide. Please advise.      Cover My Meds Key:  [id number]

## 2024-11-18 NOTE — TELEPHONE ENCOUNTER
lov 3/8/22  Last med refill 3/8/22  90 day supply request Detail Level: Detailed General Sunscreen Counseling: I recommended a broad spectrum sunscreen with a SPF of 30 or higher.  I explained that SPF 30 sunscreens block approximately 97 percent of the sun's harmful rays.  Sunscreens should be applied at least 15 minutes prior to expected sun exposure and then every 2 hours after that as long as sun exposure continues. If swimming or exercising sunscreen should be reapplied every 45 minutes to an hour after getting wet or sweating.  One ounce, or the equivalent of a shot glass full of sunscreen, is adequate to protect the skin not covered by a bathing suit. I also recommended a lip balm with a sunscreen as well. Sun protective clothing can be used in lieu of sunscreen but must be worn the entire time you are exposed to the sun's rays.

## 2025-03-13 ENCOUNTER — OFFICE VISIT (OUTPATIENT)
Age: 55
End: 2025-03-13

## 2025-03-13 VITALS
WEIGHT: 187.2 LBS | SYSTOLIC BLOOD PRESSURE: 134 MMHG | TEMPERATURE: 97.5 F | HEART RATE: 92 BPM | DIASTOLIC BLOOD PRESSURE: 86 MMHG | HEIGHT: 67 IN | BODY MASS INDEX: 29.38 KG/M2 | OXYGEN SATURATION: 95 % | RESPIRATION RATE: 19 BRPM

## 2025-03-13 DIAGNOSIS — R53.1 WEAKNESS GENERALIZED: ICD-10-CM

## 2025-03-13 DIAGNOSIS — G35 MULTIPLE SCLEROSIS EXACERBATION (HCC): ICD-10-CM

## 2025-03-13 DIAGNOSIS — R53.82 CHRONIC FATIGUE: ICD-10-CM

## 2025-03-13 DIAGNOSIS — R41.3 MEMORY LOSS: ICD-10-CM

## 2025-03-13 DIAGNOSIS — G56.02 CARPAL TUNNEL SYNDROME OF LEFT WRIST: Primary | ICD-10-CM

## 2025-03-13 DIAGNOSIS — R27.0 ATAXIA: ICD-10-CM

## 2025-03-13 DIAGNOSIS — G35 MULTIPLE SCLEROSIS (HCC): ICD-10-CM

## 2025-03-13 DIAGNOSIS — Z51.81 THERAPEUTIC DRUG MONITORING: ICD-10-CM

## 2025-03-13 DIAGNOSIS — M54.16 LUMBAR BACK PAIN WITH RADICULOPATHY AFFECTING RIGHT LOWER EXTREMITY: ICD-10-CM

## 2025-03-13 DIAGNOSIS — M47.22 CERVICAL RADICULOPATHY DUE TO DEGENERATIVE JOINT DISEASE OF SPINE: ICD-10-CM

## 2025-03-13 DIAGNOSIS — M54.16 LUMBAR BACK PAIN WITH RADICULOPATHY AFFECTING LEFT LOWER EXTREMITY: ICD-10-CM

## 2025-03-13 RX ORDER — BENAZEPRIL/HYDROCHLOROTHIAZIDE 20 MG-25MG
1 TABLET ORAL DAILY
COMMUNITY
Start: 2025-02-20 | End: 2026-02-20

## 2025-03-13 RX ORDER — UBIDECARENONE 75 MG
50 CAPSULE ORAL DAILY
COMMUNITY

## 2025-03-13 ASSESSMENT — PATIENT HEALTH QUESTIONNAIRE - PHQ9
SUM OF ALL RESPONSES TO PHQ QUESTIONS 1-9: 0
1. LITTLE INTEREST OR PLEASURE IN DOING THINGS: NOT AT ALL
SUM OF ALL RESPONSES TO PHQ QUESTIONS 1-9: 0
2. FEELING DOWN, DEPRESSED OR HOPELESS: NOT AT ALL

## 2025-03-14 LAB
ALBUMIN SERPL-MCNC: 3.9 G/DL (ref 3.5–5)
ALBUMIN/GLOB SERPL: 1 (ref 1.1–2.2)
ALP SERPL-CCNC: 85 U/L (ref 45–117)
ALT SERPL-CCNC: 26 U/L (ref 12–78)
ANION GAP SERPL CALC-SCNC: 10 MMOL/L (ref 2–12)
AST SERPL-CCNC: 33 U/L (ref 15–37)
BASOPHILS # BLD: 0.11 K/UL (ref 0–0.1)
BASOPHILS NFR BLD: 1.6 % (ref 0–1)
BILIRUB SERPL-MCNC: 0.2 MG/DL (ref 0.2–1)
BUN SERPL-MCNC: 22 MG/DL (ref 6–20)
BUN/CREAT SERPL: 19 (ref 12–20)
CALCIUM SERPL-MCNC: 9.5 MG/DL (ref 8.5–10.1)
CHLORIDE SERPL-SCNC: 100 MMOL/L (ref 97–108)
CK SERPL-CCNC: 303 U/L (ref 26–192)
CO2 SERPL-SCNC: 26 MMOL/L (ref 21–32)
CREAT SERPL-MCNC: 1.17 MG/DL (ref 0.55–1.02)
DIFFERENTIAL METHOD BLD: ABNORMAL
EOSINOPHIL # BLD: 0.02 K/UL (ref 0–0.4)
EOSINOPHIL NFR BLD: 0.3 % (ref 0–7)
ERYTHROCYTE [DISTWIDTH] IN BLOOD BY AUTOMATED COUNT: 13 % (ref 11.5–14.5)
GLOBULIN SER CALC-MCNC: 4.1 G/DL (ref 2–4)
GLUCOSE SERPL-MCNC: 119 MG/DL (ref 65–100)
HCT VFR BLD AUTO: 42.8 % (ref 35–47)
HGB BLD-MCNC: 13.9 G/DL (ref 11.5–16)
IMM GRANULOCYTES # BLD AUTO: 0.03 K/UL (ref 0–0.04)
IMM GRANULOCYTES NFR BLD AUTO: 0.4 % (ref 0–0.5)
LYMPHOCYTES # BLD: 2.26 K/UL (ref 0.8–3.5)
LYMPHOCYTES NFR BLD: 33.2 % (ref 12–49)
MAGNESIUM SERPL-MCNC: 2.8 MG/DL (ref 1.6–2.4)
MCH RBC QN AUTO: 28.7 PG (ref 26–34)
MCHC RBC AUTO-ENTMCNC: 32.5 G/DL (ref 30–36.5)
MCV RBC AUTO: 88.2 FL (ref 80–99)
MONOCYTES # BLD: 0.67 K/UL (ref 0–1)
MONOCYTES NFR BLD: 9.9 % (ref 5–13)
NEUTS SEG # BLD: 3.71 K/UL (ref 1.8–8)
NEUTS SEG NFR BLD: 54.6 % (ref 32–75)
NRBC # BLD: 0 K/UL (ref 0–0.01)
NRBC BLD-RTO: 0 PER 100 WBC
PLATELET # BLD AUTO: 311 K/UL (ref 150–400)
PMV BLD AUTO: 10.9 FL (ref 8.9–12.9)
POTASSIUM SERPL-SCNC: 3.5 MMOL/L (ref 3.5–5.1)
PROT SERPL-MCNC: 8 G/DL (ref 6.4–8.2)
RBC # BLD AUTO: 4.85 M/UL (ref 3.8–5.2)
SODIUM SERPL-SCNC: 136 MMOL/L (ref 136–145)
WBC # BLD AUTO: 6.8 K/UL (ref 3.6–11)

## 2025-03-14 NOTE — PROGRESS NOTES
PAIN,SHORTNESS OF BREATH    Doxycycline Hives    Losartan Itching    Nebivolol Other (See Comments)     Chest pain      MRI Results (most recent):  @[facility]([id number])@    @[facility]([id number]:1)@  Review of Systems:  A comprehensive review of systems was negative except for: Constitutional: positive for fatigue and malaise  Musculoskeletal: positive for arthralgias, muscle weakness, myalgias, and stiff joints  Neurological: positive for coordination problems, gait problems, memory problems, paresthesia, weakness, and more cognitive difficulty and fatigue  Behvioral/Psych: positive for anxiety and depression   Vitals:    03/13/25 0908   BP: 134/86   BP Site: Left Upper Arm   Patient Position: Sitting   BP Cuff Size: Medium Adult   Pulse: 92   Resp: 19   Temp: 97.5 °F (36.4 °C)   TempSrc: Temporal   SpO2: 95%   Weight: 84.9 kg (187 lb 3.2 oz)   Height: 1.702 m (5' 7\")     Objective:     I      NEUROLOGICAL EXAM:     Appearance:  The patient is poorly developed, well nourished, provides a fair history and is in no acute distress.  Patient has multiple skin lesions, including nodules and papules and darkened rashes from previous lesions on her legs and arms and hands   Mental Status: Oriented to time, place and person and the president, but patient has difficulty doing serial sevens and remembering 3 of 3 words at 30 seconds, and doing math, and drawing a clock showing the time 10:50, and does have some mild cognitive impairment.. Mood and affect appropriate, but depressed.   Cranial Nerves:   Intact visual fields. Fundi are benign, with mild bilateral optic pallor seen. SHERMAN, EOM's full, no nystagmus, no ptosis. Facial sensation is normal. Corneal reflexes are not tested. Facial movement is asymmetric and weak on the right. Hearing is normal bilaterally. Palate is midline with normal sternocleidomastoid and trapezius muscles are normal. Tongue is midline.  Neck is supple without meningismus or

## 2025-04-16 ENCOUNTER — TELEPHONE (OUTPATIENT)
Age: 55
End: 2025-04-16

## 2025-04-16 NOTE — TELEPHONE ENCOUNTER
Merna with central scheduling states she is unsure what Dr. Santiago is looking for with the MRI. She states patient told her that she is allergic to contrast, so she is wondering if Dr. Santiago can send a new order with no contrast, or perhaps give patient a medication to help with her allergies to the contrast for the MRI.    Patient went ahead and scheduled the imaging for Tuesday, 4/29 at [facility], but they would like the new updated order entered before then.     Thank you.

## 2025-04-24 DIAGNOSIS — G35 MULTIPLE SCLEROSIS (HCC): Primary | ICD-10-CM

## 2025-04-29 ENCOUNTER — RESULTS FOLLOW-UP (OUTPATIENT)
Age: 55
End: 2025-04-29

## 2025-04-29 ENCOUNTER — HOSPITAL ENCOUNTER (OUTPATIENT)
Facility: HOSPITAL | Age: 55
Discharge: HOME | End: 2025-05-02
Attending: PSYCHIATRY & NEUROLOGY
Payer: COMMERCIAL

## 2025-04-29 DIAGNOSIS — G35 MULTIPLE SCLEROSIS (HCC): ICD-10-CM

## 2025-04-29 DIAGNOSIS — R53.1 WEAKNESS GENERALIZED: ICD-10-CM

## 2025-04-29 DIAGNOSIS — G35 MULTIPLE SCLEROSIS EXACERBATION (HCC): ICD-10-CM

## 2025-04-29 DIAGNOSIS — M47.22 CERVICAL RADICULOPATHY DUE TO DEGENERATIVE JOINT DISEASE OF SPINE: ICD-10-CM

## 2025-04-29 DIAGNOSIS — R27.0 ATAXIA: ICD-10-CM

## 2025-04-29 PROCEDURE — 70551 MRI BRAIN STEM W/O DYE: CPT

## 2025-04-29 PROCEDURE — 72141 MRI NECK SPINE W/O DYE: CPT

## 2025-07-23 ENCOUNTER — OFFICE VISIT (OUTPATIENT)
Age: 55
End: 2025-07-23
Payer: COMMERCIAL

## 2025-07-23 VITALS
RESPIRATION RATE: 18 BRPM | WEIGHT: 199.8 LBS | SYSTOLIC BLOOD PRESSURE: 138 MMHG | DIASTOLIC BLOOD PRESSURE: 82 MMHG | BODY MASS INDEX: 31.36 KG/M2 | HEART RATE: 89 BPM | OXYGEN SATURATION: 95 % | HEIGHT: 67 IN

## 2025-07-23 DIAGNOSIS — R26.9 GAIT ABNORMALITY: ICD-10-CM

## 2025-07-23 DIAGNOSIS — M51.361 DEGENERATION OF INTERVERTEBRAL DISC OF LUMBAR REGION WITH LOWER EXTREMITY PAIN: ICD-10-CM

## 2025-07-23 DIAGNOSIS — G35 MULTIPLE SCLEROSIS (HCC): Primary | ICD-10-CM

## 2025-07-23 PROCEDURE — 3079F DIAST BP 80-89 MM HG: CPT | Performed by: NURSE PRACTITIONER

## 2025-07-23 PROCEDURE — 3075F SYST BP GE 130 - 139MM HG: CPT | Performed by: NURSE PRACTITIONER

## 2025-07-23 PROCEDURE — 99214 OFFICE O/P EST MOD 30 MIN: CPT | Performed by: NURSE PRACTITIONER

## 2025-07-23 RX ORDER — FLUTICASONE PROPIONATE 44 UG/1
1 AEROSOL, METERED RESPIRATORY (INHALATION) DAILY
COMMUNITY

## 2025-07-23 RX ORDER — IBUPROFEN 200 MG
200 TABLET ORAL EVERY 6 HOURS PRN
COMMUNITY

## 2025-07-23 RX ORDER — GABAPENTIN 100 MG/1
100 CAPSULE ORAL 2 TIMES DAILY
Qty: 60 CAPSULE | Refills: 5 | Status: SHIPPED | OUTPATIENT
Start: 2025-07-23 | End: 2026-01-19

## 2025-07-23 RX ORDER — METHYLPREDNISOLONE 4 MG/1
TABLET ORAL
Qty: 1 KIT | Refills: 0 | Status: SHIPPED | OUTPATIENT
Start: 2025-07-23 | End: 2025-07-29

## 2025-07-23 ASSESSMENT — PATIENT HEALTH QUESTIONNAIRE - PHQ9
SUM OF ALL RESPONSES TO PHQ QUESTIONS 1-9: 0
SUM OF ALL RESPONSES TO PHQ QUESTIONS 1-9: 0
1. LITTLE INTEREST OR PLEASURE IN DOING THINGS: NOT AT ALL
SUM OF ALL RESPONSES TO PHQ QUESTIONS 1-9: 0
2. FEELING DOWN, DEPRESSED OR HOPELESS: NOT AT ALL
SUM OF ALL RESPONSES TO PHQ QUESTIONS 1-9: 0

## 2025-07-23 ASSESSMENT — ENCOUNTER SYMPTOMS: BACK PAIN: 1

## 2025-07-23 NOTE — PROGRESS NOTES
Chief Complaint   Patient presents with    Multiple Sclerosis     Follow up - not doing to good - something going on with her left side       Results     Would like to go over the Brain and Cervical MRI      1. Have you been to the ER, urgent care clinic since your last visit?  Hospitalized since your last visit? No     2. Have you seen or consulted any other health care providers outside of the [facility] since your last visit?  Include any pap smears or colon screening. Yes PCP    
MRI in 2021, which showed disk bulges and arthritis encroaching on the nerves. A prescription for Medrol Dosepak will be provided, to be taken for 6 days. She is advised to monitor her blood pressure while on this medication. Gabapentin 100 mg will be prescribed, to be taken twice daily as needed for pain management. She is advised to try the steroid first and then use gabapentin if the pain persists.  Patient may benefit from a referral to physical therapist and/or an interventional pain specialist based upon results.    3.  Gait abnormality  Patient is advised to continue with assistive device to help with fall prevention.  Lumbar MRI to be completed for further investigation of symptoms.         Patient and/or family verbalized understand of all instructions and all questions/concerns were addressed.  Safety/side effects of medications discussed.    Patient remains a complex patient secondary to polypharmacy, significant comorbid conditions, and use of high-risk medications which complicate the decision making process related to patient's neurologic diagnosis.    The patient is to follow up in 2 months, sooner if needed.  The patient (or guardian, if applicable) and other individuals in attendance with the patient were advised that Artificial Intelligence will be utilized during this visit to record, process the conversation to generate a clinical note, and support improvement of the AI technology. The patient (or guardian, if applicable) and other individuals in attendance at the appointment consented to the use of AI, including the recording.                   ROLAND Hallman

## 2025-08-20 DIAGNOSIS — G35 MULTIPLE SCLEROSIS (HCC): ICD-10-CM

## 2025-08-20 RX ORDER — TERIFLUNOMIDE 14 MG/1
TABLET, FILM COATED ORAL
Qty: 90 TABLET | Refills: 1 | Status: ACTIVE | OUTPATIENT
Start: 2025-08-20